# Patient Record
Sex: FEMALE | Race: WHITE | NOT HISPANIC OR LATINO | Employment: UNEMPLOYED | URBAN - METROPOLITAN AREA
[De-identification: names, ages, dates, MRNs, and addresses within clinical notes are randomized per-mention and may not be internally consistent; named-entity substitution may affect disease eponyms.]

---

## 2022-05-11 ENCOUNTER — OFFICE VISIT (OUTPATIENT)
Dept: FAMILY MEDICINE CLINIC | Facility: CLINIC | Age: 22
End: 2022-05-11

## 2022-05-11 VITALS
OXYGEN SATURATION: 98 % | DIASTOLIC BLOOD PRESSURE: 70 MMHG | HEART RATE: 101 BPM | TEMPERATURE: 98.4 F | BODY MASS INDEX: 40.63 KG/M2 | SYSTOLIC BLOOD PRESSURE: 110 MMHG | WEIGHT: 252.8 LBS | HEIGHT: 66 IN

## 2022-05-11 DIAGNOSIS — D22.9 CHANGE IN MOLE: ICD-10-CM

## 2022-05-11 DIAGNOSIS — R00.2 PALPITATIONS: ICD-10-CM

## 2022-05-11 DIAGNOSIS — F33.2 SEVERE EPISODE OF RECURRENT MAJOR DEPRESSIVE DISORDER, WITHOUT PSYCHOTIC FEATURES: ICD-10-CM

## 2022-05-11 DIAGNOSIS — K21.9 GASTROESOPHAGEAL REFLUX DISEASE, UNSPECIFIED WHETHER ESOPHAGITIS PRESENT: ICD-10-CM

## 2022-05-11 DIAGNOSIS — J30.9 ALLERGIC RHINITIS, UNSPECIFIED SEASONALITY, UNSPECIFIED TRIGGER: ICD-10-CM

## 2022-05-11 DIAGNOSIS — F41.9 ANXIETY: ICD-10-CM

## 2022-05-11 DIAGNOSIS — K59.00 CONSTIPATION, UNSPECIFIED CONSTIPATION TYPE: ICD-10-CM

## 2022-05-11 DIAGNOSIS — Z13.220 ENCOUNTER FOR LIPID SCREENING FOR CARDIOVASCULAR DISEASE: ICD-10-CM

## 2022-05-11 DIAGNOSIS — Z00.00 ENCOUNTER FOR ANNUAL PHYSICAL EXAM: Primary | ICD-10-CM

## 2022-05-11 DIAGNOSIS — O24.419 GESTATIONAL DIABETES MELLITUS (GDM), ANTEPARTUM, GESTATIONAL DIABETES METHOD OF CONTROL UNSPECIFIED: ICD-10-CM

## 2022-05-11 DIAGNOSIS — Z13.6 ENCOUNTER FOR LIPID SCREENING FOR CARDIOVASCULAR DISEASE: ICD-10-CM

## 2022-05-11 DIAGNOSIS — R35.0 URINARY FREQUENCY: ICD-10-CM

## 2022-05-11 LAB
BILIRUB BLD-MCNC: NEGATIVE MG/DL
CLARITY, POC: CLEAR
COLOR UR: ABNORMAL
EXPIRATION DATE: ABNORMAL
GLUCOSE UR STRIP-MCNC: NEGATIVE MG/DL
KETONES UR QL: ABNORMAL
LEUKOCYTE EST, POC: NEGATIVE
Lab: ABNORMAL
NITRITE UR-MCNC: NEGATIVE MG/ML
PH UR: 5.5 [PH] (ref 5–8)
PROT UR STRIP-MCNC: ABNORMAL MG/DL
RBC # UR STRIP: NEGATIVE /UL
SP GR UR: 1.03 (ref 1–1.03)
UROBILINOGEN UR QL: NORMAL

## 2022-05-11 PROCEDURE — 81003 URINALYSIS AUTO W/O SCOPE: CPT | Performed by: NURSE PRACTITIONER

## 2022-05-11 PROCEDURE — 99385 PREV VISIT NEW AGE 18-39: CPT | Performed by: NURSE PRACTITIONER

## 2022-05-11 RX ORDER — PANTOPRAZOLE SODIUM 20 MG/1
20 TABLET, DELAYED RELEASE ORAL DAILY
Qty: 30 TABLET | Refills: 1 | Status: SHIPPED | OUTPATIENT
Start: 2022-05-11 | End: 2022-05-11 | Stop reason: SDUPTHER

## 2022-05-11 RX ORDER — PANTOPRAZOLE SODIUM 20 MG/1
20 TABLET, DELAYED RELEASE ORAL DAILY
Qty: 30 TABLET | Refills: 1 | Status: SHIPPED | OUTPATIENT
Start: 2022-05-11 | End: 2022-07-14

## 2022-05-11 RX ORDER — ESCITALOPRAM OXALATE 10 MG/1
10 TABLET ORAL DAILY
Qty: 30 TABLET | Refills: 1 | Status: SHIPPED | OUTPATIENT
Start: 2022-05-11 | End: 2022-06-13 | Stop reason: SDUPTHER

## 2022-05-11 RX ORDER — ESCITALOPRAM OXALATE 10 MG/1
10 TABLET ORAL DAILY
COMMUNITY
End: 2022-05-11 | Stop reason: SDUPTHER
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
## 2022-05-11 NOTE — PATIENT INSTRUCTIONS
Try over the counter antihistamine, such as Claritin or Allegra daily.  May add nasal steroid, such as Flonase or Nasacort.  Try over the counter MiraLax daily until loose bowel movements, then may decrease to every other day.  Continue to work on healthy diet and exercise.  Follow up pending lab results.  Follow up in 1 month, or sooner if symptoms persist or worsen.

## 2022-05-12 PROBLEM — K59.00 CONSTIPATION: Status: ACTIVE | Noted: 2022-05-12

## 2022-05-12 PROBLEM — D22.9 CHANGE IN MOLE: Status: ACTIVE | Noted: 2022-05-12

## 2022-05-12 PROBLEM — R35.0 URINARY FREQUENCY: Status: ACTIVE | Noted: 2022-05-12

## 2022-05-12 PROBLEM — R00.2 PALPITATIONS: Status: ACTIVE | Noted: 2022-05-12

## 2022-05-12 PROBLEM — K21.9 GASTROESOPHAGEAL REFLUX DISEASE: Status: ACTIVE | Noted: 2022-05-12

## 2022-05-12 PROBLEM — O24.419 GESTATIONAL DIABETES MELLITUS (GDM), ANTEPARTUM: Status: RESOLVED | Noted: 2022-05-12 | Resolved: 2022-05-12

## 2022-05-12 PROBLEM — F41.9 ANXIETY: Status: ACTIVE | Noted: 2022-05-12

## 2022-05-12 PROBLEM — J30.9 ALLERGIC RHINITIS: Status: ACTIVE | Noted: 2022-05-12

## 2022-05-12 PROBLEM — O24.419 GESTATIONAL DIABETES MELLITUS (GDM), ANTEPARTUM: Status: ACTIVE | Noted: 2022-05-12

## 2022-05-12 PROBLEM — F33.2 SEVERE EPISODE OF RECURRENT MAJOR DEPRESSIVE DISORDER, WITHOUT PSYCHOTIC FEATURES: Status: ACTIVE | Noted: 2022-05-12

## 2022-05-12 LAB
ALBUMIN SERPL-MCNC: 4.3 G/DL (ref 3.9–5)
ALBUMIN/GLOB SERPL: 1.7 {RATIO} (ref 1.2–2.2)
ALP SERPL-CCNC: 88 IU/L (ref 44–121)
ALT SERPL-CCNC: 13 IU/L (ref 0–32)
AST SERPL-CCNC: 12 IU/L (ref 0–40)
BASOPHILS # BLD AUTO: 0.1 X10E3/UL (ref 0–0.2)
BASOPHILS NFR BLD AUTO: 1 %
BILIRUB SERPL-MCNC: 0.7 MG/DL (ref 0–1.2)
BUN SERPL-MCNC: 15 MG/DL (ref 6–20)
BUN/CREAT SERPL: 21 (ref 9–23)
CALCIUM SERPL-MCNC: 9.7 MG/DL (ref 8.7–10.2)
CHLORIDE SERPL-SCNC: 103 MMOL/L (ref 96–106)
CHOLEST SERPL-MCNC: 171 MG/DL (ref 100–199)
CO2 SERPL-SCNC: 23 MMOL/L (ref 20–29)
CREAT SERPL-MCNC: 0.73 MG/DL (ref 0.57–1)
EGFRCR SERPLBLD CKD-EPI 2021: 118 ML/MIN/1.73
EOSINOPHIL # BLD AUTO: 0.2 X10E3/UL (ref 0–0.4)
EOSINOPHIL NFR BLD AUTO: 2 %
ERYTHROCYTE [DISTWIDTH] IN BLOOD BY AUTOMATED COUNT: 13.1 % (ref 11.7–15.4)
GLOBULIN SER CALC-MCNC: 2.6 G/DL (ref 1.5–4.5)
GLUCOSE SERPL-MCNC: 84 MG/DL (ref 65–99)
HBA1C MFR BLD: 5.2 % (ref 4.8–5.6)
HCT VFR BLD AUTO: 42 % (ref 34–46.6)
HDLC SERPL-MCNC: 43 MG/DL
HGB BLD-MCNC: 13.2 G/DL (ref 11.1–15.9)
IMM GRANULOCYTES # BLD AUTO: 0 X10E3/UL (ref 0–0.1)
IMM GRANULOCYTES NFR BLD AUTO: 0 %
LDLC SERPL CALC-MCNC: 111 MG/DL (ref 0–99)
LDLC/HDLC SERPL: 2.6 RATIO (ref 0–3.2)
LYMPHOCYTES # BLD AUTO: 2 X10E3/UL (ref 0.7–3.1)
LYMPHOCYTES NFR BLD AUTO: 28 %
MAGNESIUM SERPL-MCNC: 1.9 MG/DL (ref 1.6–2.3)
MCH RBC QN AUTO: 26.5 PG (ref 26.6–33)
MCHC RBC AUTO-ENTMCNC: 31.4 G/DL (ref 31.5–35.7)
MCV RBC AUTO: 84 FL (ref 79–97)
MONOCYTES # BLD AUTO: 0.4 X10E3/UL (ref 0.1–0.9)
MONOCYTES NFR BLD AUTO: 6 %
NEUTROPHILS # BLD AUTO: 4.4 X10E3/UL (ref 1.4–7)
NEUTROPHILS NFR BLD AUTO: 63 %
PLATELET # BLD AUTO: 424 X10E3/UL (ref 150–450)
POTASSIUM SERPL-SCNC: 4.9 MMOL/L (ref 3.5–5.2)
PROT SERPL-MCNC: 6.9 G/DL (ref 6–8.5)
RBC # BLD AUTO: 4.99 X10E6/UL (ref 3.77–5.28)
SODIUM SERPL-SCNC: 142 MMOL/L (ref 134–144)
TRIGL SERPL-MCNC: 90 MG/DL (ref 0–149)
TSH SERPL DL<=0.005 MIU/L-ACNC: 1.01 UIU/ML (ref 0.45–4.5)
VLDLC SERPL CALC-MCNC: 17 MG/DL (ref 5–40)
WBC # BLD AUTO: 7.1 X10E3/UL (ref 3.4–10.8)

## 2022-05-12 NOTE — ASSESSMENT & PLAN NOTE
Try over the counter MiraLax daily until loose bowel movements, then may decrease to every other day.

## 2022-05-12 NOTE — ASSESSMENT & PLAN NOTE
Try over the counter antihistamine, such as Claritin or Allegra daily.  May add nasal steroid, such as Flonase or Nasacort.

## 2022-05-12 NOTE — ASSESSMENT & PLAN NOTE
Patient's depression is recurrent and is severe without psychosis. Their depression is currently active and the condition is worsening since has been off medication. This will be reassessed in 4 weeks. F/U as described:patient was prescribed an antidepressant medicine and patient referred to Mental Health Specialist.  Resume Escitalopram daily.

## 2022-05-13 DIAGNOSIS — K30 INDIGESTION: ICD-10-CM

## 2022-05-13 DIAGNOSIS — K21.9 GASTROESOPHAGEAL REFLUX DISEASE, UNSPECIFIED WHETHER ESOPHAGITIS PRESENT: Primary | ICD-10-CM

## 2022-06-13 ENCOUNTER — OFFICE VISIT (OUTPATIENT)
Dept: FAMILY MEDICINE CLINIC | Facility: CLINIC | Age: 22
End: 2022-06-13

## 2022-06-13 VITALS
HEART RATE: 92 BPM | HEIGHT: 66 IN | BODY MASS INDEX: 38.92 KG/M2 | SYSTOLIC BLOOD PRESSURE: 102 MMHG | WEIGHT: 242.2 LBS | OXYGEN SATURATION: 97 % | DIASTOLIC BLOOD PRESSURE: 68 MMHG | TEMPERATURE: 98 F

## 2022-06-13 DIAGNOSIS — R00.2 PALPITATIONS: ICD-10-CM

## 2022-06-13 DIAGNOSIS — K30 INDIGESTION: ICD-10-CM

## 2022-06-13 DIAGNOSIS — F33.2 SEVERE EPISODE OF RECURRENT MAJOR DEPRESSIVE DISORDER, WITHOUT PSYCHOTIC FEATURES: ICD-10-CM

## 2022-06-13 DIAGNOSIS — F41.9 ANXIETY: Primary | ICD-10-CM

## 2022-06-13 DIAGNOSIS — K21.9 GASTROESOPHAGEAL REFLUX DISEASE, UNSPECIFIED WHETHER ESOPHAGITIS PRESENT: ICD-10-CM

## 2022-06-13 PROCEDURE — 93000 ELECTROCARDIOGRAM COMPLETE: CPT | Performed by: NURSE PRACTITIONER

## 2022-06-13 PROCEDURE — 99213 OFFICE O/P EST LOW 20 MIN: CPT | Performed by: NURSE PRACTITIONER

## 2022-06-13 RX ORDER — ESCITALOPRAM OXALATE 10 MG/1
10 TABLET ORAL DAILY
Qty: 30 TABLET | Refills: 2 | Status: SHIPPED | OUTPATIENT
Start: 2022-06-13 | End: 2022-07-15

## 2022-06-13 NOTE — PATIENT INSTRUCTIONS
Call  regarding psychiatry referral.  Continue to work on healthy diet and exercise.  Follow up in 3 months, or sooner if symptoms persist or worsen.  Follow up as scheduled with GI.

## 2022-06-14 PROBLEM — K30 INDIGESTION: Status: ACTIVE | Noted: 2022-06-14

## 2022-06-14 NOTE — ASSESSMENT & PLAN NOTE
Not much improvement with Pantoprazole.  Reschedule abdominal US.  Follow up as scheduled with GI.

## 2022-06-14 NOTE — ASSESSMENT & PLAN NOTE
Continue Escitalopram daily.  Call  regarding psychiatry referral.  Continue to work on healthy diet and exercise.

## 2022-06-14 NOTE — ASSESSMENT & PLAN NOTE
Patient's depression is recurrent and is moderate without psychosis. Their depression is currently active and the condition is improving with treatment. This will be reassessed in 3 months. F/U as described:patient will continue current medication therapy and patient referred to Mental Health Specialist.  Continue Escitalopram daily.

## 2022-07-12 ENCOUNTER — OFFICE VISIT (OUTPATIENT)
Dept: GASTROENTEROLOGY | Facility: CLINIC | Age: 22
End: 2022-07-12

## 2022-07-12 VITALS
TEMPERATURE: 97.8 F | HEIGHT: 66 IN | DIASTOLIC BLOOD PRESSURE: 72 MMHG | SYSTOLIC BLOOD PRESSURE: 114 MMHG | WEIGHT: 252.4 LBS | BODY MASS INDEX: 40.56 KG/M2 | HEART RATE: 89 BPM | OXYGEN SATURATION: 97 %

## 2022-07-12 DIAGNOSIS — R12 HEARTBURN: ICD-10-CM

## 2022-07-12 DIAGNOSIS — R10.10 PAIN OF UPPER ABDOMEN: Primary | ICD-10-CM

## 2022-07-12 DIAGNOSIS — R11.0 NAUSEA: ICD-10-CM

## 2022-07-12 PROCEDURE — 99214 OFFICE O/P EST MOD 30 MIN: CPT | Performed by: NURSE PRACTITIONER

## 2022-07-12 NOTE — PROGRESS NOTES
"No chief complaint on file.          History of Present Illness  21-year-old female presents today for abdominal pain.  She is a new patient with our practice.    She has had right upper quadrant abdominal pain for the past 5 to 6 years after eating.  Pain is worse with larger meals.  No specific food triggers.  Pain last 20+ minutes.  Pain will radiate to her back.      Previous treatment with omeprazole did not provide improvement in symptoms.  She is currently on pantoprazole, she also does not think this is providing improvement.    She will have difficulty with drier bulkier foods that can feel stuck in her esophagus.  No forced regurgitation.  No difficulty with liquids or pills.    She also has nausea that is worse with larger meals.  No vomiting.      No weight loss.     No NSAIDS.    No previous EGD.    Daily use of vape.    Review of Systems      Result Review :       Hemoglobin A1c (05/11/2022 13:53)   Comprehensive Metabolic Panel (05/11/2022 13:53)   CBC & Differential (05/11/2022 13:53)       Vital Signs:   /72   Pulse 89   Temp 97.8 °F (36.6 °C)   Ht 167.6 cm (66\")   Wt 114 kg (252 lb 6.4 oz)   SpO2 97%   BMI 40.74 kg/m²     Body mass index is 40.74 kg/m².     Physical Exam  Vitals reviewed.   Constitutional:       General: She is not in acute distress.     Appearance: Normal appearance. She is not ill-appearing, toxic-appearing or diaphoretic.   Abdominal:      General: Bowel sounds are normal. There is no distension.      Palpations: Abdomen is soft. Abdomen is not rigid. There is no pulsatile mass.      Tenderness: There is abdominal tenderness (Right upper quadrant tenderness with light palpation). There is no guarding or rebound.   Neurological:      Mental Status: She is alert.       Assessment and Plan    Diagnoses and all orders for this visit:    1. Pain of upper abdomen (Primary)    2. Nausea    3. Heartburn    Longstanding abdominal pain right upper quadrant with oral intake.  "   Unknown etiology.  Will continue pantoprazole.  Recommend evaluation of gallbladder and EGD.  Orders placed for HIDA scan and right upper quadrant ultrasound.  Recommend follow-up after the above work-up.        Patient Instructions   Proceed with gallbladder evaluation with HIDA scan and ultrasound    Schedule EGD for further evaluation, orders placed.    Continue pantoprazole    Additional recommendations will be made based on EGD findings.           EMR Dragon/Transcription Disclaimer:  This document has been Dictated utilizing Dragon dictation.

## 2022-07-12 NOTE — PATIENT INSTRUCTIONS
Proceed with gallbladder evaluation with HIDA scan and ultrasound    Schedule EGD for further evaluation, orders placed.    Continue pantoprazole    Additional recommendations will be made based on EGD findings.

## 2022-07-13 ENCOUNTER — PATIENT ROUNDING (BHMG ONLY) (OUTPATIENT)
Dept: GASTROENTEROLOGY | Facility: CLINIC | Age: 22
End: 2022-07-13

## 2022-07-13 ENCOUNTER — PREP FOR SURGERY (OUTPATIENT)
Dept: SURGERY | Facility: SURGERY CENTER | Age: 22
End: 2022-07-13

## 2022-07-13 DIAGNOSIS — R12 HEARTBURN: ICD-10-CM

## 2022-07-13 DIAGNOSIS — R10.10 PAIN OF UPPER ABDOMEN: Primary | ICD-10-CM

## 2022-07-13 DIAGNOSIS — R11.0 NAUSEA: ICD-10-CM

## 2022-07-13 DIAGNOSIS — K21.9 GASTROESOPHAGEAL REFLUX DISEASE, UNSPECIFIED WHETHER ESOPHAGITIS PRESENT: ICD-10-CM

## 2022-07-13 RX ORDER — SODIUM CHLORIDE 0.9 % (FLUSH) 0.9 %
3 SYRINGE (ML) INJECTION EVERY 12 HOURS SCHEDULED
Status: CANCELLED | OUTPATIENT
Start: 2022-07-13

## 2022-07-13 RX ORDER — SODIUM CHLORIDE, SODIUM LACTATE, POTASSIUM CHLORIDE, CALCIUM CHLORIDE 600; 310; 30; 20 MG/100ML; MG/100ML; MG/100ML; MG/100ML
30 INJECTION, SOLUTION INTRAVENOUS CONTINUOUS PRN
Status: CANCELLED | OUTPATIENT
Start: 2022-07-13

## 2022-07-13 RX ORDER — SODIUM CHLORIDE 0.9 % (FLUSH) 0.9 %
10 SYRINGE (ML) INJECTION AS NEEDED
Status: CANCELLED | OUTPATIENT
Start: 2022-07-13

## 2022-07-13 NOTE — PROGRESS NOTES
July 13, 2022    Hello, may I speak with Clair Overton?    My name is Chloé     I am nurse with [facility]  [address] [address] [phone].    Before we get started may I verify your date of birth? 2000    I am calling to officially welcome you to our practice and ask about your recent visit. Is this a good time to talk? Yes    Tell me about your visit with us. What things went well?  it went very well.       We're always looking for ways to make our patients' experiences even better. Do you have recommendations on ways we may improve?  no    Overall were you satisfied with your first visit to our practice? yes       I appreciate you taking the time to speak with me today. Is there anything else I can do for you?no      Thank you, and have a great day.

## 2022-07-14 DIAGNOSIS — F41.9 ANXIETY: ICD-10-CM

## 2022-07-14 DIAGNOSIS — F33.2 SEVERE EPISODE OF RECURRENT MAJOR DEPRESSIVE DISORDER, WITHOUT PSYCHOTIC FEATURES: ICD-10-CM

## 2022-07-14 DIAGNOSIS — K21.9 GASTROESOPHAGEAL REFLUX DISEASE, UNSPECIFIED WHETHER ESOPHAGITIS PRESENT: ICD-10-CM

## 2022-07-14 RX ORDER — PANTOPRAZOLE SODIUM 20 MG/1
20 TABLET, DELAYED RELEASE ORAL DAILY
Qty: 30 TABLET | Refills: 1 | Status: SHIPPED | OUTPATIENT
Start: 2022-07-14 | End: 2023-03-14

## 2022-07-14 NOTE — TELEPHONE ENCOUNTER
Rx Refill Note  Requested Prescriptions     Pending Prescriptions Disp Refills   • pantoprazole (PROTONIX) 20 MG EC tablet [Pharmacy Med Name: PANTOPRAZOLE 20MG TABLETS] 30 tablet 1     Sig: TAKE 1 TABLET BY MOUTH DAILY      Last office visit with prescribing clinician: 6/13/2022      Next office visit with prescribing clinician: 9/13/2022     Last refill 05/11/22           Ese Huggins MA  07/14/22, 13:15 EDT

## 2022-07-15 RX ORDER — ESCITALOPRAM OXALATE 10 MG/1
10 TABLET ORAL DAILY
Qty: 30 TABLET | Refills: 2 | Status: SHIPPED | OUTPATIENT
Start: 2022-07-15 | End: 2022-12-14 | Stop reason: SDUPTHER

## 2022-07-15 NOTE — TELEPHONE ENCOUNTER
Rx Refill Note  Requested Prescriptions     Pending Prescriptions Disp Refills   • escitalopram (LEXAPRO) 10 MG tablet [Pharmacy Med Name: ESCITALOPRAM 10MG TABLETS] 30 tablet 2     Sig: TAKE 1 TABLET BY MOUTH DAILY      Last office visit with prescribing clinician: 6/13/2022      Next office visit with prescribing clinician: 9/13/2022     LAST REFILL 06/13/2022           Ese Huggins MA  07/15/22, 09:49 EDT

## 2022-09-13 ENCOUNTER — OFFICE VISIT (OUTPATIENT)
Dept: FAMILY MEDICINE CLINIC | Facility: CLINIC | Age: 22
End: 2022-09-13

## 2022-09-13 VITALS
SYSTOLIC BLOOD PRESSURE: 100 MMHG | DIASTOLIC BLOOD PRESSURE: 70 MMHG | HEART RATE: 94 BPM | HEIGHT: 66 IN | WEIGHT: 254.2 LBS | BODY MASS INDEX: 40.85 KG/M2 | TEMPERATURE: 98 F | OXYGEN SATURATION: 97 %

## 2022-09-13 DIAGNOSIS — F33.2 SEVERE EPISODE OF RECURRENT MAJOR DEPRESSIVE DISORDER, WITHOUT PSYCHOTIC FEATURES: ICD-10-CM

## 2022-09-13 DIAGNOSIS — N30.00 ACUTE CYSTITIS WITHOUT HEMATURIA: ICD-10-CM

## 2022-09-13 DIAGNOSIS — R00.2 PALPITATIONS: ICD-10-CM

## 2022-09-13 DIAGNOSIS — K21.9 GASTROESOPHAGEAL REFLUX DISEASE, UNSPECIFIED WHETHER ESOPHAGITIS PRESENT: ICD-10-CM

## 2022-09-13 DIAGNOSIS — F41.9 ANXIETY: ICD-10-CM

## 2022-09-13 DIAGNOSIS — J01.90 ACUTE NON-RECURRENT SINUSITIS, UNSPECIFIED LOCATION: ICD-10-CM

## 2022-09-13 DIAGNOSIS — R35.0 URINARY FREQUENCY: Primary | ICD-10-CM

## 2022-09-13 LAB
B-HCG UR QL: NEGATIVE
BILIRUB BLD-MCNC: NEGATIVE MG/DL
CLARITY, POC: CLEAR
COLOR UR: YELLOW
EXPIRATION DATE: ABNORMAL
EXPIRATION DATE: NORMAL
GLUCOSE UR STRIP-MCNC: NEGATIVE MG/DL
INTERNAL NEGATIVE CONTROL: NORMAL
INTERNAL POSITIVE CONTROL: NORMAL
KETONES UR QL: NEGATIVE
LEUKOCYTE EST, POC: ABNORMAL
Lab: ABNORMAL
Lab: NORMAL
NITRITE UR-MCNC: NEGATIVE MG/ML
PH UR: 6 [PH] (ref 5–8)
PROT UR STRIP-MCNC: NEGATIVE MG/DL
RBC # UR STRIP: NEGATIVE /UL
SP GR UR: 1.02 (ref 1–1.03)
UROBILINOGEN UR QL: ABNORMAL

## 2022-09-13 PROCEDURE — 99214 OFFICE O/P EST MOD 30 MIN: CPT | Performed by: NURSE PRACTITIONER

## 2022-09-13 PROCEDURE — 81025 URINE PREGNANCY TEST: CPT | Performed by: NURSE PRACTITIONER

## 2022-09-13 PROCEDURE — 81003 URINALYSIS AUTO W/O SCOPE: CPT | Performed by: NURSE PRACTITIONER

## 2022-09-13 RX ORDER — AMOXICILLIN 875 MG/1
875 TABLET, COATED ORAL 2 TIMES DAILY
Qty: 14 TABLET | Refills: 0 | Status: SHIPPED | OUTPATIENT
Start: 2022-09-13 | End: 2022-09-13

## 2022-09-13 RX ORDER — AMOXICILLIN AND CLAVULANATE POTASSIUM 875; 125 MG/1; MG/1
1 TABLET, FILM COATED ORAL 2 TIMES DAILY
Qty: 14 TABLET | Refills: 0 | Status: ON HOLD | OUTPATIENT
Start: 2022-09-13 | End: 2022-09-21

## 2022-09-13 NOTE — PATIENT INSTRUCTIONS
Increase Escitalopram 10 mg to 1.5 tablets daily for total of 15 mg daily.  Try nasal steroid, such as Flonase or Nasacort, daily.  Follow up in 3 months, or sooner if symptoms persist or worsen.  Call [phone] to schedule an appointment with cardiology.

## 2022-09-13 NOTE — PROGRESS NOTES
Subjective   Clair Overton is a 22 y.o. female.     Chief Complaint   Patient presents with   • Anxiety     Follow up        History of Present Illness   Patient presents for follow up anxiety/depression: takes Escitalopram daily and helps; has not been as irritable; occasional trouble falling asleep or staying asleep; hard to get up in morning; has had a lot of stress; has not been able to see psychiatry at this point; some days eats too much, some days does not eat at all; see PHQ-9 and HAO-7; no SI/HI; concenred about how would feel with higher dose    F/U indigestion/NATALIA: takes Pantoprazole daily; has been out of medication for period of time; saw GI; will be having US gall bladder and HIDA scan as well as EGD; waiting on insurance to approve.    F/U palpitations: sees cardiology    The following portions of the patient's history were reviewed and updated as appropriate: allergies, current medications, past family history, past medical history, past social history, past surgical history and problem list.    Current Outpatient Medications on File Prior to Visit   Medication Sig   • escitalopram (LEXAPRO) 10 MG tablet TAKE 1 TABLET BY MOUTH DAILY   • pantoprazole (PROTONIX) 20 MG EC tablet TAKE 1 TABLET BY MOUTH DAILY     No current facility-administered medications on file prior to visit.        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   • Shoulder joint pain 8/5/2013       Past Surgical History:   Procedure Laterality Date   • NO PAST SURGERIES         Family History   Problem Relation Age of Onset   • Breast cancer Mother 45   • Ulcerative colitis Father    • Arthritis Father    • Irritable bowel syndrome Maternal Grandmother    • Diabetes Maternal Grandmother    • Hypertension Maternal Grandmother    • Thyroid disease  Maternal Grandmother    • Migraines Maternal Grandmother    • Heart failure Maternal Grandfather    • Diabetes Maternal Grandfather    • Osteoarthritis Maternal Grandfather    • Asthma Maternal Grandfather    • Hyperlipidemia Maternal Grandfather    • Hypertension Maternal Grandfather    • Colon cancer Paternal Grandfather    • Colon polyps Neg Hx    • Crohn's disease Neg Hx        Social History     Socioeconomic History   • Marital status:    Tobacco Use   • Smoking status: Never Smoker   • Smokeless tobacco: Never Used   Vaping Use   • Vaping Use: Every day   • Substances: Nicotine   Substance and Sexual Activity   • Alcohol use: No   • Drug use: Never   • Sexual activity: Not Currently       Review of Systems   Constitutional: Positive for fatigue. Negative for appetite change, chills, fever, unexpected weight gain (weight seems to continue to increase despite being more active; tries to eat healthy diet) and unexpected weight loss.   HENT: Positive for congestion, postnasal drip and sinus pressure (symptoms started about 2 weeks ago; has tried OTC sinus med and helped short term; has tested for COVID-19 virus several times and has been negative). Rhinorrhea: has had runny/stuffy nose.    Respiratory: Negative for chest tightness and shortness of breath. Cough: mild cough on occasion.    Cardiovascular: Negative for chest pain and leg swelling. Palpitations: some at times, sometimes at rest, sometimes with activity.   Gastrointestinal: Negative for blood in stool and GERD. Abdominal pain: some in RUQ at times after eating. Constipation: some at times. Diarrhea: some at times. Indigestion: chest will get tight with eating.   Endocrine: Polydipsia: some.   Genitourinary: Negative for dysuria. Frequency: some for last 3-4 weeks; LMP in July, may have had menses in August. Vaginal bleeding: had some light spotting for 3 days and then resolved.   Musculoskeletal: Back pain: no change in chronic lower back  "pain; occasional raditation of pain down legs, left greater than right; no bladder or bowel dysfunction.   Skin: Negative for rash.   Neurological: Negative for syncope and light-headedness. Dizziness: some on 9/9/22. Headache: some since yesterday with sinus symptoms.       PHQ-9 Depression Screening  Little interest or pleasure in doing things? 1-->several days   Feeling down, depressed, or hopeless? 0-->not at all   Trouble falling or staying asleep, or sleeping too much? 3-->nearly every day   Feeling tired or having little energy? 3-->nearly every day   Poor appetite or overeating? 1-->several days   Feeling bad about yourself - or that you are a failure or have let yourself or your family down? 0-->not at all   Trouble concentrating on things, such as reading the newspaper or watching television? 3-->nearly every day   Moving or speaking so slowly that other people could have noticed? Or the opposite - being so fidgety or restless that you have been moving around a lot more than usual? 0-->not at all   Thoughts that you would be better off dead, or of hurting yourself in some way? 0-->not at all   PHQ-9 Total Score 11   If you checked off any problems, how difficult have these problems made it for you to do your work, take care of things at home, or get along with other people? somewhat difficult     HAO-7 anxiety score: 10    Objective   Vitals:    09/13/22 1256   BP: 100/70   BP Location: Left arm   Patient Position: Sitting   Cuff Size: Adult   Pulse: 94   Temp: 98 °F (36.7 °C)   SpO2: 97%   Weight: 115 kg (254 lb 3.2 oz)   Height: 167.6 cm (66\")     Body mass index is 41.03 kg/m².    Physical Exam  Vitals and nursing note reviewed.   Constitutional:       General: She is not in acute distress.     Appearance: She is well-developed and well-groomed. She is not diaphoretic.   HENT:      Head: Normocephalic.      Right Ear: External ear normal. No decreased hearing noted. Right ear middle ear effusion: mild. " Tympanic membrane is not erythematous.      Left Ear: External ear normal. No decreased hearing noted. Left ear middle ear effusion: mild. Tympanic membrane is not erythematous.      Nose: Nose normal.      Right Sinus: No maxillary sinus tenderness or frontal sinus tenderness.      Left Sinus: No maxillary sinus tenderness or frontal sinus tenderness.      Mouth/Throat:      Mouth: Mucous membranes are moist.      Pharynx: Oropharyngeal exudate: cobblestoning in posterior pharynx. Posterior oropharyngeal erythema: mild in posterior pharynx.   Eyes:      Conjunctiva/sclera: Conjunctivae normal.   Neck:      Vascular: No carotid bruit.   Cardiovascular:      Rate and Rhythm: Normal rate and regular rhythm.      Pulses: Normal pulses.      Heart sounds: Normal heart sounds. No murmur heard.  Pulmonary:      Effort: Pulmonary effort is normal. No respiratory distress.      Breath sounds: Normal breath sounds.   Abdominal:      General: Bowel sounds are normal.      Palpations: Abdomen is soft. There is no hepatomegaly or splenomegaly.      Tenderness: There is abdominal tenderness (mild) in the right upper quadrant. There is no guarding.   Musculoskeletal:      Cervical back: Normal range of motion and neck supple.      Right lower leg: No edema.      Left lower leg: No edema.   Lymphadenopathy:      Cervical: No cervical adenopathy.   Skin:     General: Skin is warm and dry.      Findings: No rash.   Neurological:      Mental Status: She is alert and oriented to person, place, and time.      Gait: Gait is intact.   Psychiatric:         Mood and Affect: Mood normal.         Behavior: Behavior normal.         Thought Content: Thought content normal.         Cognition and Memory: Cognition normal.         Judgment: Judgment normal.         Lab Results   Component Value Date    WBC 7.1 05/11/2022    RBC 4.99 05/11/2022    HGB 13.2 05/11/2022    HCT 42.0 05/11/2022    MCV 84 05/11/2022    MCH 26.5 (L) 05/11/2022    [facility]  31.4 (L) 05/11/2022    RDW 13.1 05/11/2022     05/11/2022    NEUTRORELPCT 63 05/11/2022    LYMPHORELPCT 28 05/11/2022    MONORELPCT 6 05/11/2022    EOSRELPCT 2 05/11/2022    BASORELPCT 1 05/11/2022    NEUTROABS 4.4 05/11/2022    LYMPHSABS 2.0 05/11/2022    MONOSABS 0.4 05/11/2022    EOSABS 0.2 05/11/2022    BASOSABS 0.1 05/11/2022     Lab Results   Component Value Date    GLUCOSE 84 05/11/2022    BUN 15 05/11/2022    CREATININE 0.73 05/11/2022    BCR 21 05/11/2022    K 4.9 05/11/2022    CO2 23 05/11/2022    CALCIUM 9.7 05/11/2022    PROTENTOTREF 6.9 05/11/2022    ALBUMIN 4.3 05/11/2022    LABIL2 1.7 05/11/2022    AST 12 05/11/2022    ALT 13 05/11/2022      Lab Results   Component Value Date    CHLPL 171 05/11/2022    TRIG 90 05/11/2022    HDL 43 05/11/2022    VLDL 17 05/11/2022     (H) 05/11/2022     Lab Results   Component Value Date    TSH 1.010 05/11/2022     Lab Results   Component Value Date    HGBA1C 5.2 05/11/2022     Lab Results   Component Value Date    COLORU Yellow 09/13/2022    CLARITYU Clear 09/13/2022    LEUKOCYTESUR Large (3+) (A) 09/13/2022    BILIRUBINUR Negative 09/13/2022          Assessment    Problem List Items Addressed This Visit     Anxiety    Current Assessment & Plan     Increase Escitalopram 10 mg to 1.5 tablets daily for total of 15 mg daily.         Severe episode of recurrent major depressive disorder, without psychotic features (HCC)    Current Assessment & Plan     Patient's depression is recurrent and is moderate without psychosis. Their depression is currently active and the condition is improving with treatment. This will be reassessed in 3 months. F/U as described:patient will continue current medication therapy.  Increase Escitalopram 10 mg to 1.5 tablets daily for total of 15 mg daily.         Urinary frequency - Primary    Relevant Orders    POC Pregnancy, Urine (Completed)    POC Urinalysis Dipstick, Automated (Completed)    Urine Culture - Urine, Urine, Clean  Catch    Palpitations    Current Assessment & Plan     Call [phone] to schedule an appointment with cardiology.         Gastroesophageal reflux disease    Current Assessment & Plan     Resume Pantoprazole daily.         Acute non-recurrent sinusitis    Current Assessment & Plan     Try nasal steroid, such as Flonase or Nasacort, daily.         Relevant Medications    amoxicillin-clavulanate (Augmentin) 875-125 MG per tablet    Acute cystitis without hematuria    Relevant Medications    amoxicillin-clavulanate (Augmentin) 875-125 MG per tablet          Return in about 3 months (around 12/13/2022) for Recheck.or sooner if symptoms persist or worsen.  Patient is concerned about higher dose of Escitalopram, but mood is not well controlled; will try taking 1.5 tablets of Escitalopram 10 mg for total of 15 mg daily and see if tolerated.       COVID-19 Precautions - Patient was compliant in wearing a mask. When I saw the patient, I used appropriate personal protective equipment (PPE) including mask, gloves, and eye shield (standard procedure).  Hand hygiene was completed before and after seeing the patient.

## 2022-09-14 ENCOUNTER — TELEPHONE (OUTPATIENT)
Dept: GASTROENTEROLOGY | Facility: CLINIC | Age: 22
End: 2022-09-14

## 2022-09-14 NOTE — TELEPHONE ENCOUNTER
Patient calls wanting to make sure [facility] has approved and is paying for her endoscopy.      [phone]

## 2022-09-15 PROBLEM — J01.90 ACUTE NON-RECURRENT SINUSITIS: Status: ACTIVE | Noted: 2022-09-15

## 2022-09-15 PROBLEM — N30.00 ACUTE CYSTITIS WITHOUT HEMATURIA: Status: ACTIVE | Noted: 2022-09-15

## 2022-09-15 NOTE — ASSESSMENT & PLAN NOTE
Patient's depression is recurrent and is moderate without psychosis. Their depression is currently active and the condition is improving with treatment. This will be reassessed in 3 months. F/U as described:patient will continue current medication therapy.  Increase Escitalopram 10 mg to 1.5 tablets daily for total of 15 mg daily.

## 2022-09-16 NOTE — SIGNIFICANT NOTE
Education provided the Patient on the following:    - Nothing to Eat or Drink after MN the night before the procedure  -You will need to have someone drive you home after your EGD and remain with you for 24 hours after the EGD  - The date of your EGD, your are welcome to have one visitor at bedside or remain within 10-15 minutes of [facility] [place]  -Please wear warm socks when you arrive for your EGD  -Remove all jewelry and leave any valuables before arriving on the date of your procedure (all will have to be removed before leaving preop)  -You will need to arrive at 0700 on 9/21/22 EGD  -Feel free to contact us at: [phone] with any additional questions/concerns

## 2022-09-18 LAB
BACTERIA UR CULT: NORMAL
BACTERIA UR CULT: NORMAL

## 2022-09-21 ENCOUNTER — ANESTHESIA EVENT (OUTPATIENT)
Dept: SURGERY | Facility: SURGERY CENTER | Age: 22
End: 2022-09-21

## 2022-09-21 ENCOUNTER — HOSPITAL ENCOUNTER (OUTPATIENT)
Facility: SURGERY CENTER | Age: 22
Setting detail: HOSPITAL OUTPATIENT SURGERY
Discharge: HOME | End: 2022-09-21
Attending: INTERNAL MEDICINE | Admitting: INTERNAL MEDICINE

## 2022-09-21 ENCOUNTER — ANESTHESIA (OUTPATIENT)
Dept: SURGERY | Facility: SURGERY CENTER | Age: 22
End: 2022-09-21

## 2022-09-21 VITALS
SYSTOLIC BLOOD PRESSURE: 122 MMHG | HEIGHT: 66 IN | WEIGHT: 252.8 LBS | RESPIRATION RATE: 16 BRPM | DIASTOLIC BLOOD PRESSURE: 86 MMHG | BODY MASS INDEX: 40.63 KG/M2 | HEART RATE: 90 BPM | OXYGEN SATURATION: 99 % | TEMPERATURE: 98 F

## 2022-09-21 DIAGNOSIS — K21.9 GASTROESOPHAGEAL REFLUX DISEASE, UNSPECIFIED WHETHER ESOPHAGITIS PRESENT: ICD-10-CM

## 2022-09-21 DIAGNOSIS — J01.90 ACUTE NON-RECURRENT SINUSITIS, UNSPECIFIED LOCATION: ICD-10-CM

## 2022-09-21 DIAGNOSIS — R11.0 NAUSEA: ICD-10-CM

## 2022-09-21 DIAGNOSIS — R12 HEARTBURN: ICD-10-CM

## 2022-09-21 DIAGNOSIS — R10.10 PAIN OF UPPER ABDOMEN: ICD-10-CM

## 2022-09-21 DIAGNOSIS — N30.00 ACUTE CYSTITIS WITHOUT HEMATURIA: ICD-10-CM

## 2022-09-21 LAB
B-HCG UR QL: NEGATIVE
EXPIRATION DATE: NORMAL
GLUCOSE BLDC GLUCOMTR-MCNC: 130 MG/DL (ref 70–130)
INTERNAL NEGATIVE CONTROL: NEGATIVE
INTERNAL POSITIVE CONTROL: POSITIVE
Lab: NORMAL

## 2022-09-21 PROCEDURE — 25010000002 PROPOFOL 10 MG/ML EMULSION: Performed by: ANESTHESIOLOGY

## 2022-09-21 PROCEDURE — 43249 ESOPH EGD DILATION <30 MM: CPT | Performed by: INTERNAL MEDICINE

## 2022-09-21 PROCEDURE — 88305 TISSUE EXAM BY PATHOLOGIST: CPT | Performed by: INTERNAL MEDICINE

## 2022-09-21 PROCEDURE — 43239 EGD BIOPSY SINGLE/MULTIPLE: CPT | Performed by: INTERNAL MEDICINE

## 2022-09-21 PROCEDURE — C1726 CATH, BAL DIL, NON-VASCULAR: HCPCS | Performed by: INTERNAL MEDICINE

## 2022-09-21 PROCEDURE — 81025 URINE PREGNANCY TEST: CPT | Performed by: NURSE PRACTITIONER

## 2022-09-21 RX ORDER — SODIUM CHLORIDE 0.9 % (FLUSH) 0.9 %
3 SYRINGE (ML) INJECTION EVERY 12 HOURS SCHEDULED
Status: DISCONTINUED | OUTPATIENT
Start: 2022-09-21 | End: 2022-09-21 | Stop reason: HOSPADM

## 2022-09-21 RX ORDER — PROPOFOL 10 MG/ML
VIAL (ML) INTRAVENOUS AS NEEDED
Status: DISCONTINUED | OUTPATIENT
Start: 2022-09-21 | End: 2022-09-21 | Stop reason: SURG

## 2022-09-21 RX ORDER — LIDOCAINE HYDROCHLORIDE 20 MG/ML
INJECTION, SOLUTION INFILTRATION; PERINEURAL AS NEEDED
Status: DISCONTINUED | OUTPATIENT
Start: 2022-09-21 | End: 2022-09-21 | Stop reason: SURG

## 2022-09-21 RX ORDER — SODIUM CHLORIDE, SODIUM LACTATE, POTASSIUM CHLORIDE, CALCIUM CHLORIDE 600; 310; 30; 20 MG/100ML; MG/100ML; MG/100ML; MG/100ML
30 INJECTION, SOLUTION INTRAVENOUS CONTINUOUS PRN
Status: DISCONTINUED | OUTPATIENT
Start: 2022-09-21 | End: 2022-09-21 | Stop reason: HOSPADM

## 2022-09-21 RX ORDER — AMOXICILLIN AND CLAVULANATE POTASSIUM 875; 125 MG/1; MG/1
1 TABLET, FILM COATED ORAL 2 TIMES DAILY
Qty: 14 TABLET | Refills: 0 | Status: SHIPPED | OUTPATIENT
Start: 2022-09-21 | End: 2022-09-28

## 2022-09-21 RX ORDER — SODIUM CHLORIDE 0.9 % (FLUSH) 0.9 %
10 SYRINGE (ML) INJECTION AS NEEDED
Status: DISCONTINUED | OUTPATIENT
Start: 2022-09-21 | End: 2022-09-21 | Stop reason: HOSPADM

## 2022-09-21 RX ADMIN — LIDOCAINE HYDROCHLORIDE 100 MG: 20 INJECTION, SOLUTION INFILTRATION; PERINEURAL at 08:32

## 2022-09-21 RX ADMIN — SODIUM CHLORIDE, SODIUM LACTATE, POTASSIUM CHLORIDE, AND CALCIUM CHLORIDE 30 ML/HR: .6; .31; .03; .02 INJECTION, SOLUTION INTRAVENOUS at 07:50

## 2022-09-21 RX ADMIN — PROPOFOL 100 MG: 10 INJECTION, EMULSION INTRAVENOUS at 08:32

## 2022-09-21 RX ADMIN — PROPOFOL 140 MCG/KG/MIN: 10 INJECTION, EMULSION INTRAVENOUS at 08:37

## 2022-09-21 RX ADMIN — PROPOFOL 50 MG: 10 INJECTION, EMULSION INTRAVENOUS at 08:33

## 2022-09-21 NOTE — ANESTHESIA PREPROCEDURE EVALUATION
" Anesthesia Evaluation     Patient summary reviewed and Nursing notes reviewed   history of anesthetic complications:  NPO Solid Status: > 8 hours  NPO Liquid Status: > 2 hours           Airway   Mallampati: II  Dental - normal exam     Pulmonary - negative pulmonary ROS   Cardiovascular - negative cardio ROS  Exercise tolerance: good (4-7 METS)    Rhythm: regular        Neuro/Psych  (+) psychiatric history Anxiety and Depression,    GI/Hepatic/Renal/Endo    (+) morbid obesity, GERD,  diabetes mellitus,     Musculoskeletal     (+) back pain,   Abdominal   (+) obese,    Substance History - negative use     OB/GYN negative ob/gyn ROS         Other                        Anesthesia Plan    ASA 2     MAC     (/76 (BP Location: Left arm, Patient Position: Lying)   Pulse 100   Temp 36.6 °C (97.8 °F) (Tympanic)   Resp 16   Ht 167.6 cm (66\")   Wt 115 kg (252 lb 12.8 oz)   SpO2 100%   BMI 40.80 kg/m²     I have reviewed the patient's history with the patient and the chart, including all pertinent laboratory results and imaging. I have explained the risks of anesthesia including but not limited to dental damage, corneal abrasion, nerve injury, MI, stroke, and death.    )    Anesthetic plan, risks, benefits, and alternatives have been provided, discussed and informed consent has been obtained with: patient and spouse/significant other.        CODE STATUS:       "

## 2022-09-21 NOTE — H&P
No chief complaint on file.      HPI  Patient today for an EGD due to epigastric pain, GERD and occasional dysphagia.         Problem List:    Patient Active Problem List   Diagnosis   • Anxiety   • Severe episode of recurrent major depressive disorder, without psychotic features ([facility])   • Urinary frequency   • Palpitations   • Gastroesophageal reflux disease   • Change in mole   • Constipation   • Allergic rhinitis   • Indigestion   • Pain of upper abdomen   • Nausea   • Heartburn   • Acute non-recurrent sinusitis   • Acute cystitis without hematuria       Medical History:    Past Medical History:   Diagnosis Date   • Anxiety    • Back pain    • Gestational diabetes mellitus in pregnancy    • Heart palpitations    • 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   • Shoulder joint pain 08/05/2013        Social History:    Social History     Socioeconomic History   • Marital status:    Tobacco Use   • Smoking status: Never Smoker   • Smokeless tobacco: Never Used   Vaping Use   • Vaping Use: Every day   • Substances: Nicotine   Substance and Sexual Activity   • Alcohol use: No   • Drug use: Never   • Sexual activity: Not Currently       Family History:   Family History   Problem Relation Age of Onset   • Breast cancer Mother 45   • Ulcerative colitis Father    • Arthritis Father    • Irritable bowel syndrome Maternal Grandmother    • Diabetes Maternal Grandmother    • Hypertension Maternal Grandmother    • Thyroid disease Maternal Grandmother    • Migraines Maternal Grandmother    • Heart failure Maternal Grandfather    • Diabetes Maternal Grandfather    • Osteoarthritis Maternal Grandfather    • Asthma Maternal Grandfather    • Hyperlipidemia Maternal Grandfather    • Hypertension Maternal Grandfather    • Colon cancer Paternal Grandfather    • Colon polyps Neg Hx    • Crohn's disease Neg Hx        Surgical History:   Past Surgical History:   Procedure Laterality Date   • NO PAST SURGERIES           Current  Facility-Administered Medications:   •  lactated ringers infusion, 30 mL/hr, Intravenous, Continuous PRN, Marcia Ruffey C, APRN, Last Rate: 30 mL/hr at 09/21/22 0750, 30 mL/hr at 09/21/22 0750  •  sodium chloride 0.9 % flush 10 mL, 10 mL, Intravenous, PRN, Speedy, Griselda C, APRN  •  sodium chloride 0.9 % flush 3 mL, 3 mL, Intravenous, Q12H, Griselda Ruff C, APRN    Allergies: No Known Allergies     The following portions of the patient's history were reviewed by me and updated as appropriate: review of systems, allergies, current medications, past family history, past medical history, past social history, past surgical history and problem list.    Vitals:    09/21/22 0747   BP: 125/76   Pulse: 100   Resp: 16   Temp: 97.8 °F (36.6 °C)   SpO2: 100%       PHYSICAL EXAM:    CONSTITUTIONAL:  today's vital signs reviewed by me  GASTROINTESTINAL: abdomen is soft nontender nondistended with normal active bowel sounds, no masses are appreciated    Assessment/ Plan  We will proceed today with EGD.    Risks and benefits as well as alternatives to endoscopic evaluation were explained to the patient and they voiced understanding and wish to proceed.  These risks include but are not limited to the risk of bleeding, perforation, adverse reaction to sedation, and missed lesions.  The patient was given the opportunity to ask questions prior to the endoscopic procedure.

## 2022-09-21 NOTE — ANESTHESIA POSTPROCEDURE EVALUATION
"Patient: Clair Overton    Procedure Summary     Date: 09/21/22 Room / Location: [facility] / [facility]    Anesthesia Start: 0827 Anesthesia Stop: 0845    Procedure: ESOPHAGOGASTRODUODENOSCOPY WITH COLD BIOPSIES AND WITH BALLOON DILATATION 18MM-20MM (N/A ) Diagnosis:       Pain of upper abdomen      Nausea      Heartburn      Gastroesophageal reflux disease, unspecified whether esophagitis present      (Pain of upper abdomen [R10.10])      (Nausea [R11.0])      (Heartburn [R12])      (Gastroesophageal reflux disease, unspecified whether esophagitis present [K21.9])    Surgeons: Luis A Galindo MD Provider: Tricia Trevizo MD    Anesthesia Type: MAC ASA Status: 2          Anesthesia Type: MAC    Vitals  Vitals Value Taken Time   /78 09/21/22 0848   Temp 36.7 °C (98 °F) 09/21/22 0848   Pulse 82 09/21/22 0848   Resp 16 09/21/22 0848   SpO2 99 % 09/21/22 0848           Post Anesthesia Care and Evaluation    Patient location during evaluation: bedside  Patient participation: complete - patient participated  Level of consciousness: awake  Pain management: adequate    Airway patency: patent  Anesthetic complications: No anesthetic complications  PONV Status: controlled  Cardiovascular status: acceptable  Respiratory status: acceptable  Hydration status: acceptable    Comments: /78 (BP Location: Left arm, Patient Position: Lying)   Pulse 82   Temp 36.7 °C (98 °F) (Temporal)   Resp 16   Ht 167.6 cm (66\")   Wt 115 kg (252 lb 12.8 oz)   SpO2 99%   BMI 40.80 kg/m²         "

## 2022-09-22 LAB
LAB AP CASE REPORT: NORMAL
PATH REPORT.FINAL DX SPEC: NORMAL
PATH REPORT.GROSS SPEC: NORMAL

## 2022-12-14 ENCOUNTER — OFFICE VISIT (OUTPATIENT)
Dept: FAMILY MEDICINE CLINIC | Facility: CLINIC | Age: 22
End: 2022-12-14

## 2022-12-14 VITALS
OXYGEN SATURATION: 98 % | TEMPERATURE: 97.8 F | HEIGHT: 66 IN | DIASTOLIC BLOOD PRESSURE: 64 MMHG | HEART RATE: 90 BPM | RESPIRATION RATE: 16 BRPM | BODY MASS INDEX: 40.98 KG/M2 | SYSTOLIC BLOOD PRESSURE: 118 MMHG | WEIGHT: 255 LBS

## 2022-12-14 DIAGNOSIS — R10.10 PAIN OF UPPER ABDOMEN: ICD-10-CM

## 2022-12-14 DIAGNOSIS — J30.9 ALLERGIC RHINITIS, UNSPECIFIED SEASONALITY, UNSPECIFIED TRIGGER: ICD-10-CM

## 2022-12-14 DIAGNOSIS — M94.0 COSTOCHONDRITIS: ICD-10-CM

## 2022-12-14 DIAGNOSIS — F41.9 ANXIETY: ICD-10-CM

## 2022-12-14 DIAGNOSIS — K30 INDIGESTION: ICD-10-CM

## 2022-12-14 DIAGNOSIS — R00.2 PALPITATIONS: ICD-10-CM

## 2022-12-14 DIAGNOSIS — F33.2 SEVERE EPISODE OF RECURRENT MAJOR DEPRESSIVE DISORDER, WITHOUT PSYCHOTIC FEATURES: ICD-10-CM

## 2022-12-14 DIAGNOSIS — L30.4 INTERTRIGO: ICD-10-CM

## 2022-12-14 DIAGNOSIS — K21.9 GASTROESOPHAGEAL REFLUX DISEASE, UNSPECIFIED WHETHER ESOPHAGITIS PRESENT: Primary | ICD-10-CM

## 2022-12-14 PROCEDURE — 99214 OFFICE O/P EST MOD 30 MIN: CPT | Performed by: NURSE PRACTITIONER

## 2022-12-14 RX ORDER — ESCITALOPRAM OXALATE 10 MG/1
10 TABLET ORAL DAILY
Qty: 30 TABLET | Refills: 2 | Status: SHIPPED | OUTPATIENT
Start: 2022-12-14 | End: 2023-03-14 | Stop reason: SDUPTHER

## 2022-12-14 RX ORDER — NYSTATIN 100000 [USP'U]/G
POWDER TOPICAL 3 TIMES DAILY
Qty: 30 G | Refills: 0 | Status: SHIPPED | OUTPATIENT
Start: 2022-12-14

## 2022-12-14 NOTE — PATIENT INSTRUCTIONS
Continue ice to chest as needed.  Try Tylenol for chest tenderness.  Try over the counter antihistamine, such as Claritin or Allegra.  Follow up in 3 months, or sooner if symptoms persist or worsen.

## 2022-12-14 NOTE — PROGRESS NOTES
Subjective   Clair Overton is a 22 y.o. female.     Chief Complaint   Patient presents with   • Anxiety       History of Present Illness   Patient presents for follow up anxiety/depression: takes Escitalopram daily and helps; happy with current dose; does pretty good if takes medication consistently; has had a lot going on; has had some trouble sleeping the last 2 weeks; has had trouble falling asleep and staying asleep; some poor appetite; see PHQ-9 and HAO-7; no SI/HI; does not want to try higher dose; would like to see counselor.    F/U NATALIA: takes Pantoprazole daily some days, did not seem to help much; discomfort has changed, previously burning sensation, now more a sharp pain in upper abdomen; had EGD and WNL other than esophagitis; has not had recent US gall bladder and HIDA scan at this point.    F/U palpitations: no change in symptoms; never saw cardio; no SOA; occasional chest tightness with palpitations.    Also, c/o discomfort in chest; will need to pop sternum to relieve discomfort; injured chest on 7/1/22 and discomfort has persisted; area is tender with palpation; will hurt to take a deep breath at times; has tried ice, but does not help much; was seen at  [facility] and had negative x-ray of sternum.      The following portions of the patient's history were reviewed and updated as appropriate: allergies, current medications, past family history, past medical history, past social history, past surgical history and problem list.      Current Outpatient Medications   Medication Sig Dispense Refill   • escitalopram (LEXAPRO) 10 MG tablet Take 1 tablet by mouth Daily. 30 tablet 2   • pantoprazole (PROTONIX) 20 MG EC tablet TAKE 1 TABLET BY MOUTH DAILY 30 tablet 1   • nystatin (MYCOSTATIN) 333381 UNIT/GM powder Apply  topically to the appropriate area as directed 3 (Three) Times a Day. 30 g 0     No current facility-administered medications for this visit.       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 RING, ESOPHAGITIS   • NO PAST SURGERIES         Family History   Problem Relation Age of Onset   • Breast cancer Mother 45   • Ulcerative colitis Father    • Arthritis Father    • Irritable bowel syndrome Maternal Grandmother    • Diabetes Maternal Grandmother    • Hypertension Maternal Grandmother    • Thyroid disease Maternal Grandmother    • Migraines Maternal Grandmother    • Heart failure Maternal Grandfather    • Diabetes Maternal Grandfather    • Osteoarthritis Maternal Grandfather    • Asthma Maternal Grandfather    • Hyperlipidemia Maternal Grandfather    • Hypertension Maternal Grandfather    • Colon cancer Paternal Grandfather    • Colon polyps Neg Hx    • Crohn's disease Neg Hx        Social History     Socioeconomic History   • Marital status:    Tobacco Use   • Smoking status: Never   • Smokeless tobacco: Never   Vaping Use   • Vaping Use: Every day   • Substances: Nicotine   Substance and Sexual Activity   • Alcohol use: No   • Drug use: Never   • Sexual activity: Not Currently       Review of Systems   Constitutional: Positive for fatigue. Negative for chills, fever (some few weeks ago), unexpected weight gain and unexpected weight loss. Appetite change: see HPI.   HENT: Negative for ear pain, sinus pressure, sore throat and trouble swallowing.    Respiratory: Negative for cough (some 2 weeks ago, resolved) and shortness of breath.    Cardiovascular: Negative for chest pain and leg swelling.   Gastrointestinal: Negative  "for blood in stool, constipation and diarrhea. Abdominal pain: see HPI.   Genitourinary: Negative for dysuria and frequency.   Skin: Rash: will get some under bilateral breasts and in bilateral groins; area will itch and burn.   Neurological: Positive for headache (has been taking Ibuprofen and helps some; attributes to stress and poor sleep). Dizziness: some at times, not related to position changes. Light-headedness: some if gets up too fast.       PHQ-9 Depression Screening  Little interest or pleasure in doing things? 1-->several days   Feeling down, depressed, or hopeless? 3-->nearly every day   Trouble falling or staying asleep, or sleeping too much? 3-->nearly every day   Feeling tired or having little energy? 3-->nearly every day   Poor appetite or overeating? 3-->nearly every day   Feeling bad about yourself - or that you are a failure or have let yourself or your family down? 3-->nearly every day   Trouble concentrating on things, such as reading the newspaper or watching television? 0-->not at all   Moving or speaking so slowly that other people could have noticed? Or the opposite - being so fidgety or restless that you have been moving around a lot more than usual? 0-->not at all   Thoughts that you would be better off dead, or of hurting yourself in some way? 0-->not at all   PHQ-9 Total Score 16   If you checked off any problems, how difficult have these problems made it for you to do your work, take care of things at home, or get along with other people? extremely difficult       HAO-7 anxiety score 19    Objective   Vitals:    12/14/22 1342   BP: 118/64   BP Location: Right arm   Patient Position: Sitting   Cuff Size: Large Adult   Pulse: 90   Resp: 16   Temp: 97.8 °F (36.6 °C)   TempSrc: Temporal   SpO2: 98%   Weight: 116 kg (255 lb)   Height: 167.6 cm (65.98\")     Body mass index is 41.18 kg/m².    Physical Exam  Vitals and nursing note reviewed.   Constitutional:       General: She is not in acute " distress.     Appearance: She is well-developed and well-groomed. She is not diaphoretic.   HENT:      Head: Normocephalic.      Right Ear: Tympanic membrane and external ear normal. No decreased hearing noted.      Left Ear: Tympanic membrane and external ear normal. No decreased hearing noted.      Nose: Nose normal.      Right Sinus: No maxillary sinus tenderness or frontal sinus tenderness.      Left Sinus: No maxillary sinus tenderness or frontal sinus tenderness.      Mouth/Throat:      Mouth: Mucous membranes are moist.      Pharynx: Posterior oropharyngeal erythema present. No oropharyngeal exudate (drainage noted in posterior pharynx).   Eyes:      Conjunctiva/sclera: Conjunctivae normal.   Neck:      Vascular: No carotid bruit.   Cardiovascular:      Rate and Rhythm: Normal rate and regular rhythm.      Pulses: Normal pulses.      Heart sounds: Normal heart sounds. No murmur heard.  Pulmonary:      Effort: Pulmonary effort is normal. No respiratory distress.      Breath sounds: Normal breath sounds.   Chest:       Abdominal:      General: Bowel sounds are normal.      Palpations: Abdomen is soft. There is no hepatomegaly or splenomegaly.      Tenderness: There is no abdominal tenderness. There is no guarding or rebound.   Musculoskeletal:      Cervical back: Normal range of motion and neck supple.      Right lower leg: No edema.      Left lower leg: No edema.   Lymphadenopathy:      Cervical: No cervical adenopathy.   Skin:     General: Skin is warm and dry.   Neurological:      Mental Status: She is alert and oriented to person, place, and time.      Cranial Nerves: Cranial nerves 2-12 are intact.      Gait: Gait is intact.   Psychiatric:         Mood and Affect: Mood normal.         Behavior: Behavior normal.         Thought Content: Thought content normal.         Cognition and Memory: Cognition normal.         Judgment: Judgment normal.         Lab Results   Component Value Date    WBC 7.1 05/11/2022     RBC 4.99 05/11/2022    HGB 13.2 05/11/2022    HCT 42.0 05/11/2022    MCV 84 05/11/2022    MCH 26.5 (L) 05/11/2022    MCHC 31.4 (L) 05/11/2022    RDW 13.1 05/11/2022     05/11/2022    NEUTRORELPCT 63 05/11/2022    LYMPHORELPCT 28 05/11/2022    MONORELPCT 6 05/11/2022    EOSRELPCT 2 05/11/2022    BASORELPCT 1 05/11/2022    NEUTROABS 4.4 05/11/2022    LYMPHSABS 2.0 05/11/2022    MONOSABS 0.4 05/11/2022    EOSABS 0.2 05/11/2022    BASOSABS 0.1 05/11/2022     Lab Results   Component Value Date    GLUCOSE 84 05/11/2022    BUN 15 05/11/2022    CREATININE 0.73 05/11/2022    BCR 21 05/11/2022    K 4.9 05/11/2022    CO2 23 05/11/2022    CALCIUM 9.7 05/11/2022    PROTENTOTREF 6.9 05/11/2022    ALBUMIN 4.3 05/11/2022    LABIL2 1.7 05/11/2022    AST 12 05/11/2022    ALT 13 05/11/2022      Lab Results   Component Value Date    CHLPL 171 05/11/2022    TRIG 90 05/11/2022    HDL 43 05/11/2022    VLDL 17 05/11/2022     (H) 05/11/2022     Lab Results   Component Value Date    TSH 1.010 05/11/2022     Lab Results   Component Value Date    HGBA1C 5.2 05/11/2022 7/11/22 x-ray sternum: no acute fracture; xyphoid is intact      Assessment    Problem List Items Addressed This Visit     Anxiety    Current Assessment & Plan     Continue Escitalopram daily.         Relevant Medications    escitalopram (LEXAPRO) 10 MG tablet    Other Relevant Orders    Ambulatory Referral to Psychology    Severe episode of recurrent major depressive disorder, without psychotic features (HCC)    Current Assessment & Plan     Patient's depression is recurrent and is moderate without psychosis. Their depression is currently active and the condition is unchanged. This will be reassessed in 3 months. F/U as described:patient will continue current medication therapy and patient referred to Mental Health Specialist.  Continue Escitalopram daily.         Relevant Medications    escitalopram (LEXAPRO) 10 MG tablet    Other Relevant Orders     Ambulatory Referral to Psychology    Palpitations    Current Assessment & Plan     Call to schedule appointment with cardiology.         Gastroesophageal reflux disease - Primary    Current Assessment & Plan     Continue Pantoprazole daily as needed.         Allergic rhinitis    Current Assessment & Plan     Try over the counter antihistamine, such as Claritin or Allegra.         Indigestion    Relevant Orders    US Abdomen Limited    Pain of upper abdomen    Overview     Added automatically from request for surgery [id number]         Relevant Orders    US Abdomen Limited    Intertrigo    Relevant Medications    nystatin (MYCOSTATIN) [id number] UNIT/GM powder    Costochondritis    Current Assessment & Plan     Continue ice to chest as needed.  Try Tylenol for chest tenderness.               Return in about 3 months (around 3/14/2023) for Recheck.or sooner if symptoms persist or worsen.  Mood is not well controlled; pt does not want to try higher dose of Escitalopram or alternative medication; will refer to counselor/therapist for further evaluation/treatment.  Patient never heard about previously ordered abdominal US; will reorder US abdomen for further evaluation of symptoms.       COVID-19 Precautions - Patient was compliant in wearing a mask. When I saw the patient, I used appropriate personal protective equipment (PPE) including mask, gloves, and eye shield (standard procedure).  Hand hygiene was completed before and after seeing the patient.

## 2022-12-16 PROBLEM — L30.4 INTERTRIGO: Status: ACTIVE | Noted: 2022-12-16

## 2022-12-17 PROBLEM — M94.0 COSTOCHONDRITIS: Status: ACTIVE | Noted: 2022-12-17

## 2022-12-17 NOTE — ASSESSMENT & PLAN NOTE
Patient's depression is recurrent and is moderate without psychosis. Their depression is currently active and the condition is unchanged. This will be reassessed in 3 months. F/U as described:patient will continue current medication therapy and patient referred to Mental Health Specialist.  Continue Escitalopram daily.

## 2022-12-27 ENCOUNTER — APPOINTMENT (OUTPATIENT)
Dept: ULTRASOUND IMAGING | Facility: HOSPITAL | Age: 22
End: 2022-12-27

## 2022-12-28 ENCOUNTER — HOSPITAL ENCOUNTER (OUTPATIENT)
Dept: ULTRASOUND IMAGING | Facility: HOSPITAL | Age: 22
Discharge: HOME | End: 2022-12-28
Admitting: NURSE PRACTITIONER

## 2022-12-28 DIAGNOSIS — R10.10 PAIN OF UPPER ABDOMEN: ICD-10-CM

## 2022-12-28 DIAGNOSIS — K30 INDIGESTION: ICD-10-CM

## 2022-12-28 PROCEDURE — 76705 ECHO EXAM OF ABDOMEN: CPT

## 2022-12-29 ENCOUNTER — TELEPHONE (OUTPATIENT)
Dept: FAMILY MEDICINE CLINIC | Facility: CLINIC | Age: 22
End: 2022-12-29

## 2022-12-29 DIAGNOSIS — K80.20 GALLSTONES: Primary | ICD-10-CM

## 2022-12-29 NOTE — TELEPHONE ENCOUNTER
----- Message from MARY Echavarria sent at 12/28/2022  4:10 PM EST -----  Please inform patient that abdominal ultrasound shows gallstones and biliary sludge so recommend general surgery referral for possible removal.

## 2023-01-12 ENCOUNTER — OFFICE VISIT (OUTPATIENT)
Dept: SURGERY | Facility: CLINIC | Age: 23
End: 2023-01-12
Payer: OTHER GOVERNMENT

## 2023-01-12 VITALS — HEIGHT: 66 IN | WEIGHT: 259 LBS | BODY MASS INDEX: 41.62 KG/M2

## 2023-01-12 DIAGNOSIS — K80.10 CALCULUS OF GALLBLADDER WITH CHRONIC CHOLECYSTITIS WITHOUT OBSTRUCTION: Primary | ICD-10-CM

## 2023-01-12 PROCEDURE — 99204 OFFICE O/P NEW MOD 45 MIN: CPT | Performed by: SURGERY

## 2023-01-12 RX ORDER — CEFAZOLIN SODIUM 2 G/100ML
2 INJECTION, SOLUTION INTRAVENOUS ONCE
Status: CANCELLED | OUTPATIENT
Start: 2023-01-27 | End: 2023-01-12

## 2023-01-25 NOTE — PROGRESS NOTES
General Surgery  Initial Office Visit    CC: Gallstones, abdominal pain    HPI: The patient is a pleasant 22 y.o. year-old lady who presents today for evaluation of right upper quadrant postprandial abdominal pain which has been ongoing daily for the last 5 to 6 years.  It always occurs with eating, typically last about 20 to 25 minutes before subsiding, and now seems to be happening with anything that she eats or drinks.  The pain does not radiate and it feels like a stabbing sensation.  Work-up of this included an abdominal ultrasound which revealed a 2.1 cm gallstone as well as some biliary sludge.  She has a family history of gallbladder pathology in her mother who required cholecystectomy.    Past Medical History:   GERD  Anxiety  History of depression    Past Surgical History:   EGD (September 2022, Dr. 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), non-smoker, no regular alcohol use    ROS:   Constitutional: Positive for unexpected weight gain; negative for fevers or chills  HENT: Positive for tinnitus; negative for hearing loss or runny nose  Eyes: Negative for vision changes or scleral icterus  Respiratory: Positive for shortness of breath, choking, and dizziness with exertion; negative for cough  Cardiovascular: Positive for heart palpitations; negative for chest pain or leg swelling  Gastrointestinal: Positive for abdominal pain and constipation; negative for abdominal distension, nausea, vomiting, diarrhea, melena, or hematochezia  Genitourinary: Positive for frequent urination, menstrual problems, and vaginal pain; negative for  hematuria or dysuria  Endocrine: Positive for cold intolerance, heat intolerance, excessive hunger, and excessive urination; negative for excessive thirst  Musculoskeletal: Positive for joint swelling, back pain, neck pain, and limited range of motion of the back and bilateral shoulder; negative for gait instability  Neurologic: Positive for balance problems, dizziness, headaches, and lightheadedness; negative for tremors or seizures  Psychiatric: Positive for sleep disturbance, nervousness, depression, decreased concentration, and agitation; negative for suicidal ideations or agitation  All other systems reviewed and negative    Physical Exam:  Height: 167 cm  Weight: 117 kg  BMI: 41.8  General: No acute distress, well-nourished & well-developed  HEAD: normocephalic, atraumatic  EYES: normal conjunctiva, sclera anicteric  EARS: grossly normal hearing  NECK: supple, no thyromegaly  CARDIOVASCULAR: regular rate and rhythm  RESPIRATORY: clear to auscultation bilaterally  GASTROINTESTINAL: soft, mild right upper quadrant tenderness to palpation, non-distended  MUSCULOSKELETAL: normal gait and station. No gross extremity abnormalities  PSYCHIATRIC: oriented x3, normal mood and affect    IMAGING:  ABDOMINAL ULTRASOUND (12/28/2022):  IMPRESSION:  Cholelithiasis and biliary sludge. No biliary ductal dilatation.    ASSESSMENT & PLAN  Mrs. Overton is a 22-year-old lady with recently diagnosed symptomatic cholelithiasis.  I reviewed her recent gallbladder ultrasound and I recommended we proceed with laparoscopic cholecystectomy at her earliest convenience.  I discussed with her the risks of surgery which include but are not limited to bleeding, common bile duct injury, and postoperative bile leak.  I also discussed with her the risks of not operating and these include development of choledocholithiasis, cholangitis, gallstone pancreatitis, and acute cholecystitis.  She is aware the risks and benefits and would like to proceed.   I am happy to get it scheduled for the end of this month.    Mary Blanton MD  General, Robotic, and Endoscopic Surgery  [facility]    [address], [address]  [place] [address]  P: [phone]  F: [phone]

## 2023-01-27 ENCOUNTER — APPOINTMENT (OUTPATIENT)
Dept: GENERAL RADIOLOGY | Facility: HOSPITAL | Age: 23
End: 2023-01-27
Payer: OTHER GOVERNMENT

## 2023-01-27 ENCOUNTER — ANESTHESIA (OUTPATIENT)
Dept: PERIOP | Facility: HOSPITAL | Age: 23
End: 2023-01-27
Payer: OTHER GOVERNMENT

## 2023-01-27 ENCOUNTER — HOSPITAL ENCOUNTER (OUTPATIENT)
Facility: HOSPITAL | Age: 23
Setting detail: HOSPITAL OUTPATIENT SURGERY
Discharge: HOME | End: 2023-01-27
Attending: SURGERY | Admitting: SURGERY
Payer: OTHER GOVERNMENT

## 2023-01-27 ENCOUNTER — ANESTHESIA EVENT (OUTPATIENT)
Dept: PERIOP | Facility: HOSPITAL | Age: 23
End: 2023-01-27
Payer: OTHER GOVERNMENT

## 2023-01-27 VITALS
HEIGHT: 66 IN | WEIGHT: 253.2 LBS | TEMPERATURE: 98.2 F | BODY MASS INDEX: 40.69 KG/M2 | RESPIRATION RATE: 16 BRPM | DIASTOLIC BLOOD PRESSURE: 77 MMHG | OXYGEN SATURATION: 96 % | HEART RATE: 74 BPM | SYSTOLIC BLOOD PRESSURE: 126 MMHG

## 2023-01-27 DIAGNOSIS — K80.10 CALCULUS OF GALLBLADDER WITH CHRONIC CHOLECYSTITIS WITHOUT OBSTRUCTION: Primary | ICD-10-CM

## 2023-01-27 LAB
B-HCG UR QL: NEGATIVE
EXPIRATION DATE: NORMAL
GLUCOSE BLDC GLUCOMTR-MCNC: 101 MG/DL (ref 70–130)
GLUCOSE BLDC GLUCOMTR-MCNC: 116 MG/DL (ref 70–130)
INTERNAL NEGATIVE CONTROL: NEGATIVE
INTERNAL POSITIVE CONTROL: POSITIVE
Lab: NORMAL

## 2023-01-27 PROCEDURE — 25010000002 ONDANSETRON PER 1 MG: Performed by: NURSE ANESTHETIST, CERTIFIED REGISTERED

## 2023-01-27 PROCEDURE — 25010000002 FENTANYL CITRATE (PF) 50 MCG/ML SOLUTION: Performed by: NURSE ANESTHETIST, CERTIFIED REGISTERED

## 2023-01-27 PROCEDURE — 47563 LAPARO CHOLECYSTECTOMY/GRAPH: CPT | Performed by: SURGERY

## 2023-01-27 PROCEDURE — 25010000002 HYDROMORPHONE PER 4 MG: Performed by: NURSE ANESTHETIST, CERTIFIED REGISTERED

## 2023-01-27 PROCEDURE — 25010000002 DEXAMETHASONE SODIUM PHOSPHATE 20 MG/5ML SOLUTION: Performed by: NURSE ANESTHETIST, CERTIFIED REGISTERED

## 2023-01-27 PROCEDURE — 25010000002 CEFAZOLIN IN DEXTROSE 2-4 GM/100ML-% SOLUTION: Performed by: SURGERY

## 2023-01-27 PROCEDURE — 25010000002 FENTANYL CITRATE (PF) 50 MCG/ML SOLUTION: Performed by: ANESTHESIOLOGY

## 2023-01-27 PROCEDURE — 0 IOTHALAMATE 60 % SOLUTION: Performed by: SURGERY

## 2023-01-27 PROCEDURE — 25010000002 PROPOFOL 10 MG/ML EMULSION: Performed by: NURSE ANESTHETIST, CERTIFIED REGISTERED

## 2023-01-27 PROCEDURE — 25010000002 NEOSTIGMINE 5 MG/10ML SOLUTION: Performed by: NURSE ANESTHETIST, CERTIFIED REGISTERED

## 2023-01-27 PROCEDURE — 25010000002 KETOROLAC TROMETHAMINE PER 15 MG: Performed by: NURSE ANESTHETIST, CERTIFIED REGISTERED

## 2023-01-27 PROCEDURE — 74300 X-RAY BILE DUCTS/PANCREAS: CPT

## 2023-01-27 PROCEDURE — 47563 LAPARO CHOLECYSTECTOMY/GRAPH: CPT | Performed by: SPECIALIST/TECHNOLOGIST, OTHER

## 2023-01-27 PROCEDURE — 88304 TISSUE EXAM BY PATHOLOGIST: CPT | Performed by: SURGERY

## 2023-01-27 PROCEDURE — 82962 GLUCOSE BLOOD TEST: CPT

## 2023-01-27 DEVICE — LIGAMAX 5 MM ENDOSCOPIC MULTIPLE CLIP APPLIER
Type: IMPLANTABLE DEVICE | Site: ABDOMEN | Status: FUNCTIONAL
Brand: LIGAMAX

## 2023-01-27 RX ORDER — OXYCODONE HYDROCHLORIDE AND ACETAMINOPHEN 5; 325 MG/1; MG/1
1 TABLET ORAL EVERY 4 HOURS PRN
Qty: 12 TABLET | Refills: 0 | Status: SHIPPED | OUTPATIENT
Start: 2023-01-27 | End: 2023-02-14

## 2023-01-27 RX ORDER — FAMOTIDINE 10 MG/ML
20 INJECTION, SOLUTION INTRAVENOUS ONCE
Status: COMPLETED | OUTPATIENT
Start: 2023-01-27 | End: 2023-01-27

## 2023-01-27 RX ORDER — LIDOCAINE HYDROCHLORIDE 20 MG/ML
INJECTION, SOLUTION INFILTRATION; PERINEURAL AS NEEDED
Status: DISCONTINUED | OUTPATIENT
Start: 2023-01-27 | End: 2023-01-27 | Stop reason: SURG

## 2023-01-27 RX ORDER — ONDANSETRON 2 MG/ML
4 INJECTION INTRAMUSCULAR; INTRAVENOUS ONCE AS NEEDED
Status: COMPLETED | OUTPATIENT
Start: 2023-01-27 | End: 2023-01-27

## 2023-01-27 RX ORDER — ONDANSETRON 2 MG/ML
INJECTION INTRAMUSCULAR; INTRAVENOUS AS NEEDED
Status: DISCONTINUED | OUTPATIENT
Start: 2023-01-27 | End: 2023-01-27 | Stop reason: SURG

## 2023-01-27 RX ORDER — PROPOFOL 10 MG/ML
VIAL (ML) INTRAVENOUS AS NEEDED
Status: DISCONTINUED | OUTPATIENT
Start: 2023-01-27 | End: 2023-01-27 | Stop reason: SURG

## 2023-01-27 RX ORDER — LABETALOL HYDROCHLORIDE 5 MG/ML
5 INJECTION, SOLUTION INTRAVENOUS
Status: DISCONTINUED | OUTPATIENT
Start: 2023-01-27 | End: 2023-01-27 | Stop reason: HOSPADM

## 2023-01-27 RX ORDER — ROCURONIUM BROMIDE 10 MG/ML
INJECTION, SOLUTION INTRAVENOUS AS NEEDED
Status: DISCONTINUED | OUTPATIENT
Start: 2023-01-27 | End: 2023-01-27 | Stop reason: SURG

## 2023-01-27 RX ORDER — MIDAZOLAM HYDROCHLORIDE 1 MG/ML
1 INJECTION INTRAMUSCULAR; INTRAVENOUS
Status: DISCONTINUED | OUTPATIENT
Start: 2023-01-27 | End: 2023-01-27 | Stop reason: HOSPADM

## 2023-01-27 RX ORDER — SODIUM CHLORIDE 0.9 % (FLUSH) 0.9 %
3 SYRINGE (ML) INJECTION EVERY 12 HOURS SCHEDULED
Status: DISCONTINUED | OUTPATIENT
Start: 2023-01-27 | End: 2023-01-27 | Stop reason: HOSPADM

## 2023-01-27 RX ORDER — CEFAZOLIN SODIUM 2 G/100ML
2 INJECTION, SOLUTION INTRAVENOUS ONCE
Status: DISCONTINUED | OUTPATIENT
Start: 2023-01-27 | End: 2023-01-27 | Stop reason: HOSPADM

## 2023-01-27 RX ORDER — OXYCODONE AND ACETAMINOPHEN 7.5; 325 MG/1; MG/1
1 TABLET ORAL ONCE AS NEEDED
Status: COMPLETED | OUTPATIENT
Start: 2023-01-27 | End: 2023-01-27

## 2023-01-27 RX ORDER — CEFAZOLIN SODIUM 2 G/100ML
2 INJECTION, SOLUTION INTRAVENOUS EVERY 8 HOURS
Status: COMPLETED | OUTPATIENT
Start: 2023-01-27 | End: 2023-01-27

## 2023-01-27 RX ORDER — SODIUM CHLORIDE 0.9 % (FLUSH) 0.9 %
3-10 SYRINGE (ML) INJECTION AS NEEDED
Status: DISCONTINUED | OUTPATIENT
Start: 2023-01-27 | End: 2023-01-27 | Stop reason: HOSPADM

## 2023-01-27 RX ORDER — HYDROMORPHONE HYDROCHLORIDE 1 MG/ML
0.25 INJECTION, SOLUTION INTRAMUSCULAR; INTRAVENOUS; SUBCUTANEOUS
Status: DISCONTINUED | OUTPATIENT
Start: 2023-01-27 | End: 2023-01-27 | Stop reason: HOSPADM

## 2023-01-27 RX ORDER — FENTANYL CITRATE 50 UG/ML
25 INJECTION, SOLUTION INTRAMUSCULAR; INTRAVENOUS
Status: DISCONTINUED | OUTPATIENT
Start: 2023-01-27 | End: 2023-01-27 | Stop reason: HOSPADM

## 2023-01-27 RX ORDER — LIDOCAINE HYDROCHLORIDE 10 MG/ML
0.5 INJECTION, SOLUTION EPIDURAL; INFILTRATION; INTRACAUDAL; PERINEURAL ONCE AS NEEDED
Status: DISCONTINUED | OUTPATIENT
Start: 2023-01-27 | End: 2023-01-27 | Stop reason: HOSPADM

## 2023-01-27 RX ORDER — BUPIVACAINE HYDROCHLORIDE AND EPINEPHRINE 5; 5 MG/ML; UG/ML
INJECTION, SOLUTION EPIDURAL; INTRACAUDAL; PERINEURAL AS NEEDED
Status: DISCONTINUED | OUTPATIENT
Start: 2023-01-27 | End: 2023-01-27 | Stop reason: HOSPADM

## 2023-01-27 RX ORDER — SODIUM CHLORIDE, SODIUM LACTATE, POTASSIUM CHLORIDE, CALCIUM CHLORIDE 600; 310; 30; 20 MG/100ML; MG/100ML; MG/100ML; MG/100ML
9 INJECTION, SOLUTION INTRAVENOUS CONTINUOUS
Status: DISCONTINUED | OUTPATIENT
Start: 2023-01-27 | End: 2023-01-27 | Stop reason: HOSPADM

## 2023-01-27 RX ORDER — MAGNESIUM HYDROXIDE 1200 MG/15ML
LIQUID ORAL AS NEEDED
Status: DISCONTINUED | OUTPATIENT
Start: 2023-01-27 | End: 2023-01-27 | Stop reason: HOSPADM

## 2023-01-27 RX ORDER — HYDRALAZINE HYDROCHLORIDE 20 MG/ML
5 INJECTION INTRAMUSCULAR; INTRAVENOUS
Status: DISCONTINUED | OUTPATIENT
Start: 2023-01-27 | End: 2023-01-27 | Stop reason: HOSPADM

## 2023-01-27 RX ORDER — NEOSTIGMINE METHYLSULFATE 0.5 MG/ML
INJECTION, SOLUTION INTRAVENOUS AS NEEDED
Status: DISCONTINUED | OUTPATIENT
Start: 2023-01-27 | End: 2023-01-27 | Stop reason: SURG

## 2023-01-27 RX ORDER — GLYCOPYRROLATE 0.2 MG/ML
INJECTION INTRAMUSCULAR; INTRAVENOUS AS NEEDED
Status: DISCONTINUED | OUTPATIENT
Start: 2023-01-27 | End: 2023-01-27 | Stop reason: SURG

## 2023-01-27 RX ORDER — FENTANYL CITRATE 50 UG/ML
50 INJECTION, SOLUTION INTRAMUSCULAR; INTRAVENOUS
Status: DISCONTINUED | OUTPATIENT
Start: 2023-01-27 | End: 2023-01-27 | Stop reason: HOSPADM

## 2023-01-27 RX ORDER — DEXAMETHASONE SODIUM PHOSPHATE 4 MG/ML
INJECTION, SOLUTION INTRA-ARTICULAR; INTRALESIONAL; INTRAMUSCULAR; INTRAVENOUS; SOFT TISSUE AS NEEDED
Status: DISCONTINUED | OUTPATIENT
Start: 2023-01-27 | End: 2023-01-27 | Stop reason: SURG

## 2023-01-27 RX ORDER — SODIUM CHLORIDE 9 MG/ML
INJECTION, SOLUTION INTRAVENOUS AS NEEDED
Status: DISCONTINUED | OUTPATIENT
Start: 2023-01-27 | End: 2023-01-27 | Stop reason: HOSPADM

## 2023-01-27 RX ORDER — KETOROLAC TROMETHAMINE 30 MG/ML
INJECTION, SOLUTION INTRAMUSCULAR; INTRAVENOUS AS NEEDED
Status: DISCONTINUED | OUTPATIENT
Start: 2023-01-27 | End: 2023-01-27 | Stop reason: SURG

## 2023-01-27 RX ADMIN — PROPOFOL 200 MG: 10 INJECTION, EMULSION INTRAVENOUS at 10:11

## 2023-01-27 RX ADMIN — HYDROMORPHONE HYDROCHLORIDE 0.25 MG: 1 INJECTION, SOLUTION INTRAMUSCULAR; INTRAVENOUS; SUBCUTANEOUS at 11:43

## 2023-01-27 RX ADMIN — LIDOCAINE HYDROCHLORIDE 60 MG: 20 INJECTION, SOLUTION INFILTRATION; PERINEURAL at 10:11

## 2023-01-27 RX ADMIN — ROCURONIUM BROMIDE 40 MG: 10 INJECTION INTRAVENOUS at 10:11

## 2023-01-27 RX ADMIN — HYDROMORPHONE HYDROCHLORIDE 0.25 MG: 1 INJECTION, SOLUTION INTRAMUSCULAR; INTRAVENOUS; SUBCUTANEOUS at 12:00

## 2023-01-27 RX ADMIN — ONDANSETRON 4 MG: 2 INJECTION INTRAMUSCULAR; INTRAVENOUS at 11:25

## 2023-01-27 RX ADMIN — FENTANYL CITRATE 50 MCG: 50 INJECTION, SOLUTION INTRAMUSCULAR; INTRAVENOUS at 10:08

## 2023-01-27 RX ADMIN — FENTANYL CITRATE 25 MCG: 50 INJECTION, SOLUTION INTRAMUSCULAR; INTRAVENOUS at 11:25

## 2023-01-27 RX ADMIN — GLYCOPYRROLATE 0.8 MCG: 1 INJECTION INTRAMUSCULAR; INTRAVENOUS at 10:41

## 2023-01-27 RX ADMIN — LIDOCAINE HYDROCHLORIDE 60 MG: 20 INJECTION, SOLUTION INFILTRATION; PERINEURAL at 10:51

## 2023-01-27 RX ADMIN — FENTANYL CITRATE 50 MCG: 50 INJECTION, SOLUTION INTRAMUSCULAR; INTRAVENOUS at 10:25

## 2023-01-27 RX ADMIN — SODIUM CHLORIDE, POTASSIUM CHLORIDE, SODIUM LACTATE AND CALCIUM CHLORIDE 9 ML/HR: 600; 310; 30; 20 INJECTION, SOLUTION INTRAVENOUS at 09:30

## 2023-01-27 RX ADMIN — FAMOTIDINE 20 MG: 10 INJECTION INTRAVENOUS at 09:30

## 2023-01-27 RX ADMIN — CEFAZOLIN SODIUM 2 G: 2 INJECTION, SOLUTION INTRAVENOUS at 10:01

## 2023-01-27 RX ADMIN — KETOROLAC TROMETHAMINE 30 MG: 30 INJECTION, SOLUTION INTRAMUSCULAR at 10:42

## 2023-01-27 RX ADMIN — OXYCODONE AND ACETAMINOPHEN 1 TABLET: 7.5; 325 TABLET ORAL at 11:25

## 2023-01-27 RX ADMIN — DEXAMETHASONE SODIUM PHOSPHATE 8 MG: 4 INJECTION, SOLUTION INTRAMUSCULAR; INTRAVENOUS at 10:18

## 2023-01-27 RX ADMIN — ONDANSETRON 4 MG: 2 INJECTION INTRAMUSCULAR; INTRAVENOUS at 10:36

## 2023-01-27 RX ADMIN — FENTANYL CITRATE 25 MCG: 50 INJECTION, SOLUTION INTRAMUSCULAR; INTRAVENOUS at 11:30

## 2023-01-27 RX ADMIN — NEOSTIGMINE METHYLSULFATE 4 MG: 0.5 INJECTION INTRAVENOUS at 10:41

## 2023-01-27 NOTE — ANESTHESIA POSTPROCEDURE EVALUATION
"Patient: Clair Overton    Procedure Summary     Date: 01/27/23 Room / Location: [facility] OR 02 / [facility] MAIN OR    Anesthesia Start: 1006 Anesthesia Stop: 1108    Procedure: LAPAROSCOPIC CHOLECYSTECTOMY WITH INTRAOPERATIVE CHOLANGIOGRAM, POSSIBLE OPEN (Abdomen) Diagnosis:       Calculus of gallbladder with chronic cholecystitis without obstruction      (Calculus of gallbladder with chronic cholecystitis without obstruction [K80.10])    Surgeons: Mary Blanton MD Provider: Stephen Ferrer MD    Anesthesia Type: general ASA Status: 2          Anesthesia Type: general    Vitals  Vitals Value Taken Time   /81 01/27/23 1216   Temp 36.8 °C (98.2 °F) 01/27/23 1106   Pulse 86 01/27/23 1217   Resp 16 01/27/23 1115   SpO2 94 % 01/27/23 1217   Vitals shown include unvalidated device data.        Post Anesthesia Care and Evaluation    Patient location during evaluation: bedside  Patient participation: complete - patient participated  Level of consciousness: awake and alert  Pain management: adequate    Airway patency: patent  Anesthetic complications: No anesthetic complications  PONV Status: none  Cardiovascular status: acceptable  Respiratory status: acceptable  Hydration status: acceptable    Comments: /76   Pulse 76   Temp 36.8 °C (98.2 °F) (Oral)   Resp 16   Ht 167.6 cm (66\")   Wt 115 kg (253 lb 3.2 oz)   LMP 01/13/2023   SpO2 94%   BMI 40.87 kg/m²         "

## 2023-01-27 NOTE — ANESTHESIA PROCEDURE NOTES
Airway  Urgency: elective    Date/Time: 1/27/2023 10:15 AM  Airway not difficult    General Information and Staff    Patient location during procedure: OR  Anesthesiologist: Stephen Ferrer MD  CRNA/CAA: Yanni Bansal CRNA    Indications and Patient Condition  Indications for airway management: airway protection    Preoxygenated: yes  Mask difficulty assessment: 2 - vent by mask + OA or adjuvant +/- NMBA    Final Airway Details  Final airway type: endotracheal airway      Successful airway: ETT  Cuffed: yes   Successful intubation technique: direct laryngoscopy  Facilitating devices/methods: intubating stylet  Endotracheal tube insertion site: oral  Blade: Cherrie  Blade size: 3  ETT size (mm): 7.0  Cormack-Lehane Classification: grade I - full view of glottis  Placement verified by: chest auscultation and capnometry   Inital cuff pressure (cm H2O): 20  Cuff volume (mL): 7  Measured from: lips  ETT/EBT  to lips (cm): 21  Number of attempts at approach: 1  Assessment: lips, teeth, and gum same as pre-op and atraumatic intubation

## 2023-01-27 NOTE — ANESTHESIA PREPROCEDURE EVALUATION
Anesthesia Evaluation     Patient summary reviewed and Nursing notes reviewed   no history of anesthetic complications:  NPO Solid Status: > 8 hours  NPO Liquid Status: > 2 hours           Airway   Mallampati: II  TM distance: >3 FB  Neck ROM: full  no difficulty expected  Dental - normal exam     Pulmonary - normal exam   (+) a smoker Current Abstained day of surgery,   (-) COPD, asthma, lung cancer  Cardiovascular - negative cardio ROS and normal exam  Exercise tolerance: good (4-7 METS)    Rhythm: regular  Rate: normal    (-) hypertension, valvular problems/murmurs, past MI, CAD, dysrhythmias, angina, cardiac stents, CABG      Neuro/Psych  (+) psychiatric history Anxiety and Depression,    (-) seizures, TIA, CVA  GI/Hepatic/Renal/Endo    (+) obesity,  GERD well controlled,    (-) PUD, hepatitis, liver disease, no renal disease, diabetes, GI bleed, no thyroid disorder    Musculoskeletal     (+) back pain,   Abdominal  - normal exam   Substance History - negative use     OB/GYN          Other - negative ROS                       Anesthesia Plan    ASA 2     general     intravenous induction     Anesthetic plan, risks, benefits, and alternatives have been provided, discussed and informed consent has been obtained with: patient and spouse/significant other.    Plan discussed with CRNA.        CODE STATUS:

## 2023-01-30 LAB
LAB AP CASE REPORT: NORMAL
PATH REPORT.FINAL DX SPEC: NORMAL
PATH REPORT.GROSS SPEC: NORMAL

## 2023-02-14 ENCOUNTER — OFFICE VISIT (OUTPATIENT)
Dept: SURGERY | Facility: CLINIC | Age: 23
End: 2023-02-14
Payer: OTHER GOVERNMENT

## 2023-02-14 VITALS — WEIGHT: 253 LBS | BODY MASS INDEX: 40.66 KG/M2 | HEIGHT: 66 IN

## 2023-02-14 DIAGNOSIS — K80.10 CALCULUS OF GALLBLADDER WITH CHRONIC CHOLECYSTITIS WITHOUT OBSTRUCTION: ICD-10-CM

## 2023-02-14 DIAGNOSIS — Z09 SURGICAL FOLLOWUP: Primary | ICD-10-CM

## 2023-02-14 PROCEDURE — 99024 POSTOP FOLLOW-UP VISIT: CPT | Performed by: SURGERY

## 2023-02-14 NOTE — PROGRESS NOTES
CHIEF COMPLAINT:   Chief Complaint   Patient presents with   • Post-op     Laparoscopic cholecystectomy with cholangiogram  01/27/23       HISTORY OF PRESENT ILLNESS:  This is a 22 y.o. female who presents for a post-operative visit after undergoing laparoscopic cholecystectomy with cholangiogram on 1/27/2023.  She has had mild diarrhea, but nothing lifestyle limiting.  She denies any fever, chills, jaundice, or scleral icterus.  She is concerned about a couple of the incisions where the Vicryl suture is starting to come to the surface with exposed knots.  She has been unable to grasp the knots with tweezers at home.    Pathology:   Gallbladder, Cholecystectomy:                A. Chronic cholecystitis with cholelithiasis and focal cholesterolosis.    PHYSICAL EXAM:  Lungs: Clear  Heart: RRR  ABD: Incisions are healing well without any erythema or signs of infection, there are a few exposed white Vicryl suture knots embedded in the skin unable to be grasped  Ext: no significant edema, calves nontender    A/P:  This is a 22 y.o. female patient who is S/P laparoscopic cholecystectomy with cholangiogram on 1/27/2023    She is healing well.  I discussed the benign pathology findings with her.  I tried to remove a few of the exposed Vicryl suture knots but was unable to grasp them with the forceps and the suture movable kit.  I sent her home with a suture removal kit and advised her to keep an eye on those knots and pull them out once they are more superficial.  She can follow-up with me on an as-needed basis.    Mary Blanton MD  General, Robotic, and Endoscopic Surgery  [facility]    [address], [address]  [place] [address]  P: [phone]  F: [phone]

## 2023-03-14 ENCOUNTER — OFFICE VISIT (OUTPATIENT)
Dept: FAMILY MEDICINE CLINIC | Facility: CLINIC | Age: 23
End: 2023-03-14
Payer: OTHER GOVERNMENT

## 2023-03-14 VITALS
SYSTOLIC BLOOD PRESSURE: 126 MMHG | HEART RATE: 81 BPM | TEMPERATURE: 98 F | HEIGHT: 66 IN | WEIGHT: 259 LBS | DIASTOLIC BLOOD PRESSURE: 82 MMHG | BODY MASS INDEX: 41.62 KG/M2

## 2023-03-14 DIAGNOSIS — R00.2 PALPITATIONS: ICD-10-CM

## 2023-03-14 DIAGNOSIS — R06.83 SNORING: ICD-10-CM

## 2023-03-14 DIAGNOSIS — J30.9 ALLERGIC RHINITIS, UNSPECIFIED SEASONALITY, UNSPECIFIED TRIGGER: ICD-10-CM

## 2023-03-14 DIAGNOSIS — R06.81 WITNESSED EPISODE OF APNEA: ICD-10-CM

## 2023-03-14 DIAGNOSIS — R51.9 NONINTRACTABLE EPISODIC HEADACHE, UNSPECIFIED HEADACHE TYPE: Primary | ICD-10-CM

## 2023-03-14 DIAGNOSIS — F33.2 SEVERE EPISODE OF RECURRENT MAJOR DEPRESSIVE DISORDER, WITHOUT PSYCHOTIC FEATURES: ICD-10-CM

## 2023-03-14 DIAGNOSIS — F41.9 ANXIETY: ICD-10-CM

## 2023-03-14 DIAGNOSIS — L30.4 INTERTRIGO: ICD-10-CM

## 2023-03-14 PROCEDURE — 99214 OFFICE O/P EST MOD 30 MIN: CPT | Performed by: NURSE PRACTITIONER

## 2023-03-14 RX ORDER — ESCITALOPRAM OXALATE 10 MG/1
10 TABLET ORAL DAILY
Qty: 90 TABLET | Refills: 1 | Status: SHIPPED | OUTPATIENT
Start: 2023-03-14

## 2023-03-14 NOTE — PROGRESS NOTES
Subjective   Clair Overton is a 22 y.o. female.     Chief Complaint   Patient presents with   • Anxiety     Follow up          History of Present Illness   Patient presents for follow up anxiety: takes Lexapro daily and helps; some days hard, but has found other ways to cope with stress; some feeling down at times, but not as bad as in past; happy with current dose; some trouble staying asleep at times; has been walking daily and has helped; some overeating; feels down about weight at times; fidgety; has worry about going out in public; see PHQ-9 and HAO-7; no SI/HI.    F/U NATALIA: had gall bladder removed in January and no longer taking Pantoprazole daily; all symptoms resolved after removal; occasional diarrhea after eating, but tolerable.    Will get redness and itching under breasts; has a lot of moisture; uses Nystatin powder and helps some; has started wearing new brace due to discomfort in neck and back due to breasts heavy; neck and back feel better if does not wear a bra, but then will get a rash.    Also c/o headaches; has been taking Tylenol; no change in vision; has had some light sensitivity; will have some nausea, but no vomiting; moving around will worsen headache at times; no ear pain; no sore throat; no runny/stuffy nose; no fever; no recent eye exam; not sure if snores but has noted will wake up due to stops breathing.    Has been craving salt recently.      The following portions of the patient's history were reviewed and updated as appropriate: allergies, current medications, past family history, past medical history, past social history, past surgical history and problem list.      Current Outpatient Medications   Medication Sig Dispense Refill   • escitalopram (LEXAPRO) 10 MG tablet Take 1 tablet by mouth Daily. 90 tablet 1   • nystatin (MYCOSTATIN) 212401 UNIT/GM powder Apply  topically to the appropriate area as directed 3 (Three) Times a Day. 30 g 0     No current facility-administered  medications for this visit.       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   • Intertrigo     using nystatin   • Shoulder joint pain 08/05/2013       Past Surgical History:   Procedure Laterality Date   • CHOLECYSTECTOMY WITH INTRAOPERATIVE CHOLANGIOGRAM N/A 01/27/2023    Procedure: LAPAROSCOPIC CHOLECYSTECTOMY WITH INTRAOPERATIVE CHOLANGIOGRAM, POSSIBLE OPEN;  Surgeon: Mary Blanton MD;  Location: [facility] MAIN OR;  Service: General;  Laterality: N/A;   • ENDOSCOPY N/A 09/21/2022    Procedure: ESOPHAGOGASTRODUODENOSCOPY WITH COLD BIOPSIES AND WITH BALLOON DILATATION 18MM-20MM;  Surgeon: Luis A Galindo MD;  Location: [facility] MAIN OR;  Service: Gastroenterology;  Laterality: N/A;  S. RING, ESOPHAGITIS       Family History   Problem Relation Age of Onset   • Breast cancer Mother 45   • Ulcerative colitis Father    • Arthritis Father    • Irritable bowel syndrome Maternal Grandmother    • Diabetes Maternal Grandmother    • Hypertension Maternal Grandmother    • Thyroid disease Maternal Grandmother    • Migraines Maternal Grandmother    • Heart failure Maternal Grandfather    • Diabetes Maternal Grandfather    • Osteoarthritis Maternal Grandfather    • Asthma Maternal Grandfather    • Hyperlipidemia Maternal Grandfather    • Hypertension Maternal Grandfather    • Colon cancer Paternal Grandfather    • Colon polyps Neg Hx    • Crohn's disease Neg Hx    • Malig Hyperthermia Neg Hx        Social History     Socioeconomic History   • Marital status:    Tobacco Use   • Smoking status: Never   • Smokeless tobacco: Never   Vaping Use   • Vaping Use: Every day   • Substances: Nicotine   Substance and Sexual Activity   • Alcohol use: No   • Drug use: Never   • Sexual activity: Not  Currently     Trying to stop vaping.    Review of Systems   Constitutional: Positive for fatigue. Negative for appetite change, fever, unexpected weight gain and unexpected weight loss.   HENT: Positive for postnasal drip. Negative for trouble swallowing. Sinus pressure: some recently, but resolved.    Respiratory: Negative for cough, chest tightness and shortness of breath.    Cardiovascular: Negative for chest pain. Palpitations: some, seems random, no change; never heard about appointment with cardiology.   Gastrointestinal: Negative for abdominal pain and blood in stool. Diarrhea: see HPI.   Endocrine: Negative for polydipsia.   Genitourinary: Negative for dysuria and frequency.   Musculoskeletal: Back pain: see HPI; has fractures in lower back.   Skin: Negative for rash.   Neurological: Negative for dizziness and light-headedness (some if stands up too fast). Headache: see HPI.   Hematological: Does not bruise/bleed easily.   Psychiatric/Behavioral: Negative for suicidal ideas.     PHQ-9 Depression Screening  Little interest or pleasure in doing things? 0-->not at all   Feeling down, depressed, or hopeless? 2-->more than half the days   Trouble falling or staying asleep, or sleeping too much? 1-->several days   Feeling tired or having little energy? 0-->not at all   Poor appetite or overeating? 1-->several days   Feeling bad about yourself - or that you are a failure or have let yourself or your family down? 1-->several days   Trouble concentrating on things, such as reading the newspaper or watching television? 0-->not at all   Moving or speaking so slowly that other people could have noticed? Or the opposite - being so fidgety or restless that you have been moving around a lot more than usual? 2-->more than half the days   Thoughts that you would be better off dead, or of hurting yourself in some way? 0-->not at all   PHQ-9 Total Score 7   If you checked off any problems, how difficult have these problems  "made it for you to do your work, take care of things at home, or get along with other people? somewhat difficult       HAO-7 anxiety score: 2    Objective   Vitals:    03/14/23 1534   BP: 126/82   BP Location: Left arm   Patient Position: Sitting   Cuff Size: Adult   Pulse: 81   Temp: 98 °F (36.7 °C)   Weight: 117 kg (259 lb)   Height: 167.6 cm (66\")     Body mass index is 41.8 kg/m².    Physical Exam  Vitals and nursing note reviewed.   Constitutional:       General: She is not in acute distress.     Appearance: She is well-developed and well-groomed. She is not diaphoretic.   HENT:      Head: Normocephalic.      Right Ear: External ear normal. No decreased hearing noted. Right ear middle ear effusion: mild. Right ear erythematous TM: mild.      Left Ear: External ear normal. No decreased hearing noted. Left ear middle ear effusion: mild. Tympanic membrane is not erythematous.      Nose: Nose normal.      Right Sinus: No maxillary sinus tenderness or frontal sinus tenderness.      Left Sinus: No maxillary sinus tenderness or frontal sinus tenderness.      Mouth/Throat:      Mouth: Mucous membranes are moist.      Pharynx: No oropharyngeal exudate (cobblestoning present) or posterior oropharyngeal erythema.   Eyes:      Conjunctiva/sclera: Conjunctivae normal.   Neck:      Vascular: No carotid bruit.   Cardiovascular:      Rate and Rhythm: Normal rate and regular rhythm.      Pulses: Normal pulses.      Heart sounds: Normal heart sounds. No murmur heard.  Pulmonary:      Effort: Pulmonary effort is normal. No respiratory distress.      Breath sounds: Normal breath sounds.   Abdominal:      General: Bowel sounds are normal.      Palpations: Abdomen is soft. There is no hepatomegaly or splenomegaly.      Tenderness: There is no abdominal tenderness. There is no guarding.   Musculoskeletal:      Cervical back: Normal range of motion and neck supple.      Right lower leg: No edema.      Left lower leg: No edema. "   Lymphadenopathy:      Cervical: No cervical adenopathy.   Skin:     General: Skin is warm and dry.   Neurological:      Mental Status: She is alert and oriented to person, place, and time.      Gait: Gait is intact.   Psychiatric:         Mood and Affect: Mood normal.         Behavior: Behavior normal.         Thought Content: Thought content normal.         Cognition and Memory: Cognition normal.         Judgment: Judgment normal.         Lab Results   Component Value Date    WBC 7.1 05/11/2022    RBC 4.99 05/11/2022    HGB 13.2 05/11/2022    HCT 42.0 05/11/2022    MCV 84 05/11/2022    MCH 26.5 (L) 05/11/2022    MCHC 31.4 (L) 05/11/2022    RDW 13.1 05/11/2022     05/11/2022    NEUTRORELPCT 63 05/11/2022    LYMPHORELPCT 28 05/11/2022    MONORELPCT 6 05/11/2022    EOSRELPCT 2 05/11/2022    BASORELPCT 1 05/11/2022    NEUTROABS 4.4 05/11/2022    LYMPHSABS 2.0 05/11/2022    MONOSABS 0.4 05/11/2022    EOSABS 0.2 05/11/2022    BASOSABS 0.1 05/11/2022     Lab Results   Component Value Date    GLUCOSE 84 05/11/2022    BUN 15 05/11/2022    CREATININE 0.73 05/11/2022    BCR 21 05/11/2022    K 4.9 05/11/2022    CO2 23 05/11/2022    CALCIUM 9.7 05/11/2022    PROTENTOTREF 6.9 05/11/2022    ALBUMIN 4.3 05/11/2022    LABIL2 1.7 05/11/2022    AST 12 05/11/2022    ALT 13 05/11/2022      Lab Results   Component Value Date    CHLPL 171 05/11/2022    TRIG 90 05/11/2022    HDL 43 05/11/2022    VLDL 17 05/11/2022     (H) 05/11/2022     Lab Results   Component Value Date    TSH 1.010 05/11/2022     Lab Results   Component Value Date    HGBA1C 5.2 05/11/2022         Assessment    Problem List Items Addressed This Visit     Anxiety    Current Assessment & Plan     Stable.  Continue Escitalopram daily.  Continue regular exercise.         Relevant Medications    escitalopram (LEXAPRO) 10 MG tablet    Severe episode of recurrent major depressive disorder, without psychotic features (HCC)    Current Assessment & Plan      Patient's depression is recurrent and is moderate without psychosis. Their depression is currently active and the condition is improving with treatment. This will be reassessed at the next regular appointment. F/U as described:patient will continue current medication therapy.  Continue Escitalopram daily.         Relevant Medications    escitalopram (LEXAPRO) 10 MG tablet    Palpitations    Relevant Orders    Ambulatory Referral to Cardiology    TSH Rfx On Abnormal To Free T4    Comprehensive Metabolic Panel    CBC & Differential    Allergic rhinitis    Current Assessment & Plan     Try over the counter antihistamine, such as Claritin or Allegra, daily.         Intertrigo    Current Assessment & Plan     Stable.  Continue Nystatin powder.         Nonintractable episodic headache - Primary    Current Assessment & Plan     Keep headache diary.  Continue increased intake of clear liquids.  Try over the counter antihistamine, such as Claritin or Allegra, daily.         Witnessed episode of apnea    Relevant Orders    Ambulatory Referral to Sleep Medicine    Snoring    Relevant Orders    Ambulatory Referral to Sleep Medicine          Return in about 6 months (around 9/14/2023) for Annual physical, Recheck.or sooner if symptoms persist or worsen.  Discussed possible sleep apnea may be contributing to headaches; referred to sleep medicine for further evaluation; also discussed allergies may be contributing to headache and will try daily antihistamine.       COVID-19 Precautions - Patient was compliant in wearing a mask. When I saw the patient, I used appropriate personal protective equipment (PPE) including mask, gloves, and eye shield (standard procedure).  Hand hygiene was completed before and after seeing the patient.

## 2023-03-14 NOTE — PATIENT INSTRUCTIONS
Keep headache diary.  Continue increased intake of clear liquids.  Try over the counter antihistamine, such as Claritin or Allegra, daily.  Continue to work on healthy diet and exercise.  Follow up pending lab results.  Follow up in 6 months for physical with fasting labs, or sooner if symptoms persist or worsen.

## 2023-03-15 PROBLEM — R06.83 SNORING: Status: ACTIVE | Noted: 2023-03-15

## 2023-03-15 PROBLEM — K80.10 CALCULUS OF GALLBLADDER WITH CHRONIC CHOLECYSTITIS WITHOUT OBSTRUCTION: Status: RESOLVED | Noted: 2023-01-12 | Resolved: 2023-03-15

## 2023-03-15 PROBLEM — R11.0 NAUSEA: Status: RESOLVED | Noted: 2022-07-13 | Resolved: 2023-03-15

## 2023-03-15 PROBLEM — R51.9 NONINTRACTABLE EPISODIC HEADACHE: Status: ACTIVE | Noted: 2023-03-15

## 2023-03-15 PROBLEM — M94.0 COSTOCHONDRITIS: Status: RESOLVED | Noted: 2022-12-17 | Resolved: 2023-03-15

## 2023-03-15 PROBLEM — K30 INDIGESTION: Status: RESOLVED | Noted: 2022-06-14 | Resolved: 2023-03-15

## 2023-03-15 PROBLEM — R06.81 WITNESSED EPISODE OF APNEA: Status: ACTIVE | Noted: 2023-03-15

## 2023-03-15 LAB
ALBUMIN SERPL-MCNC: 4 G/DL (ref 3.9–5)
ALBUMIN/GLOB SERPL: 1.6 {RATIO} (ref 1.2–2.2)
ALP SERPL-CCNC: 88 IU/L (ref 44–121)
ALT SERPL-CCNC: 10 IU/L (ref 0–32)
AST SERPL-CCNC: 13 IU/L (ref 0–40)
BASOPHILS # BLD AUTO: 0.1 X10E3/UL (ref 0–0.2)
BASOPHILS NFR BLD AUTO: 1 %
BILIRUB SERPL-MCNC: 0.5 MG/DL (ref 0–1.2)
BUN SERPL-MCNC: 12 MG/DL (ref 6–20)
BUN/CREAT SERPL: 16 (ref 9–23)
CALCIUM SERPL-MCNC: 9 MG/DL (ref 8.7–10.2)
CHLORIDE SERPL-SCNC: 104 MMOL/L (ref 96–106)
CO2 SERPL-SCNC: 26 MMOL/L (ref 20–29)
CREAT SERPL-MCNC: 0.75 MG/DL (ref 0.57–1)
EGFRCR SERPLBLD CKD-EPI 2021: 114 ML/MIN/1.73
EOSINOPHIL # BLD AUTO: 0.2 X10E3/UL (ref 0–0.4)
EOSINOPHIL NFR BLD AUTO: 3 %
ERYTHROCYTE [DISTWIDTH] IN BLOOD BY AUTOMATED COUNT: 13.2 % (ref 11.7–15.4)
GLOBULIN SER CALC-MCNC: 2.5 G/DL (ref 1.5–4.5)
GLUCOSE SERPL-MCNC: 91 MG/DL (ref 70–99)
HCT VFR BLD AUTO: 40 % (ref 34–46.6)
HGB BLD-MCNC: 12.6 G/DL (ref 11.1–15.9)
IMM GRANULOCYTES # BLD AUTO: 0 X10E3/UL (ref 0–0.1)
IMM GRANULOCYTES NFR BLD AUTO: 0 %
LYMPHOCYTES # BLD AUTO: 2.1 X10E3/UL (ref 0.7–3.1)
LYMPHOCYTES NFR BLD AUTO: 31 %
MCH RBC QN AUTO: 25.9 PG (ref 26.6–33)
MCHC RBC AUTO-ENTMCNC: 31.5 G/DL (ref 31.5–35.7)
MCV RBC AUTO: 82 FL (ref 79–97)
MONOCYTES # BLD AUTO: 0.5 X10E3/UL (ref 0.1–0.9)
MONOCYTES NFR BLD AUTO: 8 %
NEUTROPHILS # BLD AUTO: 3.8 X10E3/UL (ref 1.4–7)
NEUTROPHILS NFR BLD AUTO: 57 %
PLATELET # BLD AUTO: 377 X10E3/UL (ref 150–450)
POTASSIUM SERPL-SCNC: 4.2 MMOL/L (ref 3.5–5.2)
PROT SERPL-MCNC: 6.5 G/DL (ref 6–8.5)
RBC # BLD AUTO: 4.87 X10E6/UL (ref 3.77–5.28)
SODIUM SERPL-SCNC: 141 MMOL/L (ref 134–144)
TSH SERPL DL<=0.005 MIU/L-ACNC: 0.81 UIU/ML (ref 0.45–4.5)
WBC # BLD AUTO: 6.8 X10E3/UL (ref 3.4–10.8)

## 2023-03-15 NOTE — ASSESSMENT & PLAN NOTE
Keep headache diary.  Continue increased intake of clear liquids.  Try over the counter antihistamine, such as Claritin or Allegra, daily.

## 2023-03-15 NOTE — ASSESSMENT & PLAN NOTE
Patient's depression is recurrent and is moderate without psychosis. Their depression is currently active and the condition is improving with treatment. This will be reassessed at the next regular appointment. F/U as described:patient will continue current medication therapy.  Continue Escitalopram daily.

## 2023-05-01 ENCOUNTER — OFFICE VISIT (OUTPATIENT)
Dept: SLEEP MEDICINE | Facility: HOSPITAL | Age: 23
End: 2023-05-01
Payer: OTHER GOVERNMENT

## 2023-05-01 VITALS
HEIGHT: 66 IN | HEART RATE: 84 BPM | OXYGEN SATURATION: 98 % | SYSTOLIC BLOOD PRESSURE: 131 MMHG | BODY MASS INDEX: 41.5 KG/M2 | DIASTOLIC BLOOD PRESSURE: 63 MMHG | WEIGHT: 258.2 LBS

## 2023-05-01 DIAGNOSIS — E66.9 OBESITY (BMI 30-39.9): ICD-10-CM

## 2023-05-01 DIAGNOSIS — R06.83 SNORING: ICD-10-CM

## 2023-05-01 DIAGNOSIS — R06.89 GASPING FOR BREATH: Primary | ICD-10-CM

## 2023-05-01 DIAGNOSIS — R06.81 WITNESSED EPISODE OF APNEA: ICD-10-CM

## 2023-05-01 PROCEDURE — G0463 HOSPITAL OUTPT CLINIC VISIT: HCPCS

## 2023-05-01 RX ORDER — ZOLPIDEM TARTRATE 5 MG/1
5 TABLET ORAL NIGHTLY PRN
Qty: 1 TABLET | Refills: 0 | Status: SHIPPED | OUTPATIENT
Start: 2023-05-01

## 2023-05-01 NOTE — PROGRESS NOTES
"[facility]  Telephone: [phone] / Fax: [phone] [place]  Telephone: [phone] / Fax: [phone] Giulia Dodson    Referring Physician: Matilda Hamlin APRN  PCP: Matilda Hamlin APRN    Reason for consult:  sleep apnea    Clair Overton is a 22 y.o.female  was seen in the Sleep Disorders Center today for evaluation of sleep apnea. She has been dealing with RLS since 2015 back injury(fracture L4 L5).  She goes to bed at 9-10pm. It takes few hours to fall asleep. She wakes up between 6-9am. She gained  lbs in past 5 years. She has recurrent sleep disturbances, choking, gasping for breath. She feels that her throat is tightening up. She reports heart palpitations, poor memory and difficulty concentrating.    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    Clair Overton  has a past medical history of Anxiety, Back pain, Calculus of gallbladder with chronic cholecystitis without obstruction (01/12/2023), Gestational diabetes mellitus in pregnancy, Heart palpitations, Instability of right shoulder joint (08/23/2013), Intertrigo, and Shoulder joint pain (08/05/2013).    Current Medications:    Current Outpatient Medications:   •  escitalopram (LEXAPRO) 10 MG tablet, Take 1 tablet by mouth Daily., Disp: 90 tablet, Rfl: 1  •  nystatin (MYCOSTATIN) 086939 UNIT/GM powder, Apply  topically to the appropriate area as directed 3 (Three) Times a Day., Disp: 30 g, Rfl: 0    I have reviewed Past Medical History, Past Surgical History, Medication List, Social History and Family History as entered in Sleep Questionnaire and EPIC.    ESS  8   Vital Signs /63   Pulse 84   Ht 167.6 cm (66\")   Wt 117 kg (258 lb 3.2 oz)   SpO2 98%   BMI 41.67 kg/m²  Body mass index is 41.67 kg/m².    General Alert and oriented. No " acute distress noted   Pharynx/Throat Class IV  Mallampati airway, large tongue, no evidence of redundant lateral pharyngeal tissue. No oral lesions. No thrush. Moist mucous membranes.   Head Normocephalic. Symmetrical. Atraumatic.    Nose No septal deviation. No drainage   Chest Wall Normal shape. Symmetric expansion with respiration. No tenderness.   Neck Trachea midline, no thyromegaly or adenopathy    Lungs Clear to auscultation bilaterally. No wheezes. No rhonchi. No rales. Respirations regular, even and unlabored.   Heart Regular rhythm and normal rate. Normal S1 and S2. No murmur   Abdomen Soft, non-tender and non-distended. Normal bowel sounds. No masses.   Extremities Moves all extremities well. No edema   Psychiatric Normal mood and affect.        Impression:  1. Gasping for breath    2. Snoring    3. Witnessed episode of apnea    4. Obesity (BMI 30-39.9)          Plan:  I scheduled in lab PSG.  discussed the pathophysiology of obstructive sleep apnea with the patient.  We discussed the adverse outcomes associated with untreated sleep-disordered breathing.  We discussed treatment modalities of obstructive sleep apnea including CPAP device. Sleep study will be scheduled to establish a definitive diagnosis of sleep disorder breathing.  Weight loss will be strongly beneficial in order to reduce the severity of sleep-disordered breathing.  Patient has narrow oropharyngeal structure.  Caution during activities that require prolonged concentration is strongly advised.  After sleep study results are available, patient will be notified, and appointment will be scheduled to discuss sleep study results and treatment recommendations.    Rx for Ambien 5mg x 1 was provided for in lab polysomnogram. Patient was instructed to bring the Ambien tablet to the sleep lab and take at lights out . Strict instructions were given to NOT take the Ambien at home.          I appreciate the opportunity to participate in this  patient's care.      MARY Pierce  [facility]  Phone: [phone]      Part of this note may be an electronic transcription/translation of spoken language to printed text using the Dragon Dictation System. Some errors may exist even though the document was edited.

## 2023-06-16 ENCOUNTER — OFFICE VISIT (OUTPATIENT)
Dept: CARDIOLOGY | Facility: CLINIC | Age: 23
End: 2023-06-16
Payer: OTHER GOVERNMENT

## 2023-06-16 ENCOUNTER — TELEPHONE (OUTPATIENT)
Dept: CARDIOLOGY | Facility: CLINIC | Age: 23
End: 2023-06-16
Payer: OTHER GOVERNMENT

## 2023-06-16 VITALS
HEART RATE: 90 BPM | WEIGHT: 255 LBS | BODY MASS INDEX: 40.98 KG/M2 | HEIGHT: 66 IN | SYSTOLIC BLOOD PRESSURE: 102 MMHG | DIASTOLIC BLOOD PRESSURE: 80 MMHG

## 2023-06-16 DIAGNOSIS — R00.2 PALPITATIONS: Primary | ICD-10-CM

## 2023-06-16 PROCEDURE — 93000 ELECTROCARDIOGRAM COMPLETE: CPT | Performed by: INTERNAL MEDICINE

## 2023-06-16 PROCEDURE — 99203 OFFICE O/P NEW LOW 30 MIN: CPT | Performed by: INTERNAL MEDICINE

## 2023-06-16 NOTE — PROGRESS NOTES
Subjective:     Encounter Date:06/16/23      Patient ID: Clair Overton is a 22 y.o. female.    Chief Complaint:  History of Present Illness    Dear Matilda,    I had the pleasure of seeing this patient in the office today for initial evaluation and consultation.  I appreciate that you sent her in to see us.  They come in today to be seen for evaluation of palpitations.    Patient has had a long history of having intermittent palpitations.  Not triggered with any activity or exercise.  She says it will hit suddenly, with maybe a second or she feels like her heart counter drops and then beats.  Then for several seconds she can just feel very bad.  She will feel presyncopal at the time.  Most significant time was when she was doing laundry and she said it almost knocked her backwards and she felt like she would just have to lay down.  No chest pain or chest discomfort.  No other illnesses.  She has been active without difficulty.    She was evaluated at [place] in 2021.  She had a Holter monitor which she never got the results.  She said no one ever called her.    This patient has no known cardiac history.  This patient has no history of coronary artery disease, congestive heart failure, rheumatic fever, rheumatic heart disease, congenital heart disease or heart murmur.  This patient has never required invasive cardiovascular evaluation.        The following portions of the patient's history were reviewed and updated as appropriate: allergies, current medications, past family history, past medical history, past social history, past surgical history and problem list.      ECG 12 Lead    Date/Time: 6/16/2023 4:06 PM  Performed by: Don Ross III, MD  Authorized by: Don Ross III, MD   Comparison: compared with previous ECG   Similar to previous ECG  Rhythm: sinus rhythm  Rate: normal  Conduction: conduction normal  ST Segments: ST segments normal  T Waves: T waves normal  QRS axis: normal  Other: no  "other findings    Clinical impression: normal ECG               Objective:     Vitals:    06/16/23 1006   BP: 102/80   Pulse: 90   Weight: 116 kg (255 lb)   Height: 167.6 cm (66\")     Body mass index is 41.16 kg/m².      Vitals reviewed.   Constitutional:       General: Not in acute distress.     Appearance: Well-developed. Not diaphoretic.   Eyes:      General:         Right eye: No discharge.         Left eye: No discharge.      Conjunctiva/sclera: Conjunctivae normal.   HENT:      Head: Normocephalic and atraumatic.      Nose: Nose normal.   Neck:      Thyroid: No thyromegaly.      Trachea: No tracheal deviation.   Pulmonary:      Effort: Pulmonary effort is normal. No respiratory distress.      Breath sounds: Normal breath sounds. No stridor.   Chest:      Chest wall: Not tender to palpatation.   Cardiovascular:      Normal rate. Regular rhythm.      Murmurs: There is no murmur.      . No S3 gallop. No click. No rub.   Pulses:     Intact distal pulses.   Abdominal:      General: Bowel sounds are normal. There is no distension.      Palpations: Abdomen is soft. There is no abdominal mass.   Musculoskeletal: Normal range of motion.         General: No tenderness or deformity.      Cervical back: Normal range of motion and neck supple. Skin:     General: Skin is warm and dry.      Findings: No erythema or rash.   Neurological:      Mental Status: Alert.   Psychiatric:         Attention and Perception: Attention normal.         Data and records reviewed:     Lab Results   Component Value Date    GLUCOSE 91 03/14/2023    BUN 12 03/14/2023    CREATININE 0.75 03/14/2023    EGFRRESULT 114 03/14/2023    BCR 16 03/14/2023    K 4.2 03/14/2023    CO2 26 03/14/2023    CALCIUM 9.0 03/14/2023    PROTENTOTREF 6.5 03/14/2023    ALBUMIN 4.0 03/14/2023    BILITOT 0.5 03/14/2023    AST 13 03/14/2023    ALT 10 03/14/2023     No results found for: CHOL  Lab Results   Component Value Date    TRIG 90 05/11/2022     Lab Results "   Component Value Date    HDL 43 05/11/2022     Lab Results   Component Value Date     (H) 05/11/2022     Lab Results   Component Value Date    VLDL 17 05/11/2022     Lab Results   Component Value Date    LDLHDL 2.6 05/11/2022     CBC        3/14/2023    16:15   CBC   WBC 6.8    RBC 4.87    Hemoglobin 12.6    Hematocrit 40.0    MCV 82    MCH 25.9    MCHC 31.5    RDW 13.2    Platelets 377      No radiology results for the last 90 days.          Assessment:          Diagnosis Plan   1. Palpitations  ECG 12 Lead             Plan:       1.  Palpitations- prior evaluation was apparently nondiagnostic.  She apparently wore a monitor.  Initially it sent like she got a Holter but then she thinks she may have worn it longer than a couple of days.  We have called to get those results, will await to see if we get these.  We will then decide how best to proceed.  We will consider an echocardiogram and further monitoring predicated on the results of the results from [facility].    Thank you very much for allowing us to participate in the care of this pleasant patient.  Please don't hesitate to call if I can be of assistance in any way.      Current Outpatient Medications:   •  escitalopram (LEXAPRO) 10 MG tablet, Take 1 tablet by mouth Daily., Disp: 90 tablet, Rfl: 1  •  nystatin (MYCOSTATIN) [id number] UNIT/GM powder, Apply  topically to the appropriate area as directed 3 (Three) Times a Day., Disp: 30 g, Rfl: 0  •  zolpidem (Ambien) 5 MG tablet, Take 1 tablet by mouth At Night As Needed for Sleep (Bring to the sleep center, do not take at home)., Disp: 1 tablet, Rfl: 0         No follow-ups on file.

## 2023-07-25 DIAGNOSIS — G47.33 OBSTRUCTIVE SLEEP APNEA: Primary | ICD-10-CM

## 2023-07-26 ENCOUNTER — TELEPHONE (OUTPATIENT)
Dept: SLEEP MEDICINE | Facility: HOSPITAL | Age: 23
End: 2023-07-26
Payer: OTHER GOVERNMENT

## 2023-07-26 ENCOUNTER — TELEPHONE (OUTPATIENT)
Dept: CARDIOLOGY | Facility: CLINIC | Age: 23
End: 2023-07-26
Payer: OTHER GOVERNMENT

## 2023-07-26 RX ORDER — ATENOLOL 25 MG/1
25 TABLET ORAL NIGHTLY
Qty: 90 TABLET | Refills: 0 | Status: SHIPPED | OUTPATIENT
Start: 2023-07-26

## 2023-07-26 NOTE — TELEPHONE ENCOUNTER
----- Message from MARY Wright sent at 7/25/2023  4:56 PM EDT -----  I am covering Dr. Yoon's in basket today because he is out of the office.      Please call patient.  She was in normal rhythm and her average heart rate was 104 bpm.  This is sinus tachycardia.  If she is symptomatic and her blood pressure is stable, she could try atenolol 25 mg 1 tablet daily at nighttime.  She would then need a follow-up with me or Dr. Ross in 3 months.  Please let us know.  Thanks

## 2023-07-26 NOTE — TELEPHONE ENCOUNTER
Notified patient of results/recommendations. Patient verbalized understanding. She is symptomatic and BP is stable. She would like to try the atenolol. Prescription sent to her requested pharmacy and Fu scheduled.     Jenniffer Gayle RN  [place] [facility]

## 2023-10-18 ENCOUNTER — OFFICE VISIT (OUTPATIENT)
Dept: FAMILY MEDICINE CLINIC | Facility: CLINIC | Age: 23
End: 2023-10-18
Payer: OTHER GOVERNMENT

## 2023-10-18 VITALS
WEIGHT: 258 LBS | DIASTOLIC BLOOD PRESSURE: 70 MMHG | BODY MASS INDEX: 41.46 KG/M2 | HEART RATE: 94 BPM | TEMPERATURE: 98 F | HEIGHT: 66 IN | OXYGEN SATURATION: 98 % | SYSTOLIC BLOOD PRESSURE: 116 MMHG

## 2023-10-18 DIAGNOSIS — E66.01 CLASS 3 SEVERE OBESITY WITH SERIOUS COMORBIDITY AND BODY MASS INDEX (BMI) OF 40.0 TO 44.9 IN ADULT, UNSPECIFIED OBESITY TYPE: ICD-10-CM

## 2023-10-18 DIAGNOSIS — R00.2 PALPITATIONS: ICD-10-CM

## 2023-10-18 DIAGNOSIS — Z13.220 ENCOUNTER FOR LIPID SCREENING FOR CARDIOVASCULAR DISEASE: ICD-10-CM

## 2023-10-18 DIAGNOSIS — D22.9 CHANGE IN MOLE: ICD-10-CM

## 2023-10-18 DIAGNOSIS — L30.9 DERMATITIS: ICD-10-CM

## 2023-10-18 DIAGNOSIS — G47.30 SLEEP APNEA, UNSPECIFIED TYPE: ICD-10-CM

## 2023-10-18 DIAGNOSIS — M25.50 ARTHRALGIA OF MULTIPLE JOINTS: ICD-10-CM

## 2023-10-18 DIAGNOSIS — F33.2 SEVERE EPISODE OF RECURRENT MAJOR DEPRESSIVE DISORDER, WITHOUT PSYCHOTIC FEATURES: ICD-10-CM

## 2023-10-18 DIAGNOSIS — J30.9 ALLERGIC RHINITIS, UNSPECIFIED SEASONALITY, UNSPECIFIED TRIGGER: ICD-10-CM

## 2023-10-18 DIAGNOSIS — Z13.6 ENCOUNTER FOR LIPID SCREENING FOR CARDIOVASCULAR DISEASE: ICD-10-CM

## 2023-10-18 DIAGNOSIS — Z00.00 ENCOUNTER FOR ANNUAL PHYSICAL EXAM: Primary | ICD-10-CM

## 2023-10-18 DIAGNOSIS — Z11.59 NEED FOR HEPATITIS C SCREENING TEST: ICD-10-CM

## 2023-10-18 DIAGNOSIS — L30.4 INTERTRIGO: ICD-10-CM

## 2023-10-18 DIAGNOSIS — F33.1 MODERATE EPISODE OF RECURRENT MAJOR DEPRESSIVE DISORDER: ICD-10-CM

## 2023-10-18 DIAGNOSIS — R53.83 FATIGUE, UNSPECIFIED TYPE: ICD-10-CM

## 2023-10-18 DIAGNOSIS — F41.9 ANXIETY: ICD-10-CM

## 2023-10-18 PROBLEM — E66.813 CLASS 3 SEVERE OBESITY WITHOUT SERIOUS COMORBIDITY WITH BODY MASS INDEX (BMI) OF 40.0 TO 44.9 IN ADULT: Status: ACTIVE | Noted: 2023-10-18

## 2023-10-18 PROBLEM — R12 HEARTBURN: Status: RESOLVED | Noted: 2022-07-13 | Resolved: 2023-10-18

## 2023-10-18 PROBLEM — R10.10 PAIN OF UPPER ABDOMEN: Status: RESOLVED | Noted: 2022-07-13 | Resolved: 2023-10-18

## 2023-10-18 RX ORDER — DESONIDE 0.5 MG/G
1 CREAM TOPICAL 2 TIMES DAILY PRN
Qty: 15 G | Refills: 0 | Status: SHIPPED | OUTPATIENT
Start: 2023-10-18

## 2023-10-18 RX ORDER — ESCITALOPRAM OXALATE 10 MG/1
20 TABLET ORAL DAILY
Qty: 180 TABLET | Refills: 0 | Status: SHIPPED | OUTPATIENT
Start: 2023-10-18

## 2023-10-18 NOTE — PATIENT INSTRUCTIONS
Increase Escitalopram 10 mg to 2 tablets daily for total of 20 mg daily.  Continue to work on healthy diet and exercise.  Try over the counter antihistamine, such as Claritin or Allegra or nasal steroid, such as Flonase or Nasacort.  Follow up pending lab results.  Follow up in 3 months, or sooner if symptoms persist or worsen.   Schedule an appointment with GYN for PAP.

## 2023-10-18 NOTE — PROGRESS NOTES
Subjective   Clair Overton is a 23 y.o. female.     Chief Complaint   Patient presents with    Annual Exam     Blood work          History of Present Illness   Patient presents for CPE with non-fasting labs; somewhat healthy diet, stress eats at times and will not eat as healthy; some exercise, has been exercising more recently, has been walking regularly and some core workouts; no recent dental visits; no recent eye exam, no vision correction; some mild decrease in hearing; immunizations: declines COVID-19 booster and flu vaccine; no recent female care; last PAP 10/2020; mammo never performed; mother with family history of breast cancer, diagnosed around age 47 years; colonoscopy never performed; no family history of colon cancer.     F/U palpitations: saw cardiology and ordered updated Holter monitor; Holter monitor noted sinus tach in low 100s; has not started on Atenolol daily yet and just picked up Rx; still has palpitations about once per week, seems random.    F/U anxiety: takes Lexapro daily and works well; considering trying a little higher dose; has trouble falling asleep and staying asleep; has not been getting much sleep due recent stressors and trouble sleeping; some overeating most days; see PHQ-9 and HAO-7; no SI/HI.    F/U intertrigo: uses Nystatin powder as needed, but does not seem to be helping much; will have redness and burning sensation in skin fold under breasts.     F/U snoring: saw sleep medicine and diagnosed with sleep apnea; started CPAP nightly and has helped.         The following portions of the patient's history were reviewed and updated as appropriate: allergies, current medications, past family history, past medical history, past social history, past surgical history and problem list.    Current Outpatient Medications on File Prior to Visit   Medication Sig    atenolol (TENORMIN) 25 MG tablet Take 1 tablet by mouth Every Night.    nystatin (MYCOSTATIN) 604662 UNIT/GM powder Apply   topically to the appropriate area as directed 3 (Three) Times a Day.    [DISCONTINUED] escitalopram (LEXAPRO) 10 MG tablet Take 1 tablet by mouth Daily.    [DISCONTINUED] zolpidem (Ambien) 5 MG tablet Take 1 tablet by mouth At Night As Needed for Sleep (Bring to the sleep center, do not take at home). (Patient not taking: Reported on 10/18/2023)     No current facility-administered medications on file prior to visit.        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    Intertrigo     using nystatin    Shoulder joint pain 08/05/2013       Past Surgical History:   Procedure Laterality Date    CHOLECYSTECTOMY WITH INTRAOPERATIVE CHOLANGIOGRAM N/A 01/27/2023    Procedure: LAPAROSCOPIC CHOLECYSTECTOMY WITH INTRAOPERATIVE CHOLANGIOGRAM, POSSIBLE OPEN;  Surgeon: Mary Blanton MD;  Location: [facility] MAIN OR;  Service: General;  Laterality: N/A;    ENDOSCOPY N/A 09/21/2022    Procedure: ESOPHAGOGASTRODUODENOSCOPY WITH COLD BIOPSIES AND WITH BALLOON DILATATION 18MM-20MM;  Surgeon: Luis A Galindo MD;  [place] [facility] [place];  Service: Gastroenterology;  Laterality: N/A;  S. RING, ESOPHAGITIS       Family History   Problem Relation Age of Onset    Breast cancer Mother 47    Ulcerative colitis Father     Arthritis Father     Irritable bowel syndrome Maternal Grandmother     Diabetes Maternal Grandmother     Hypertension Maternal Grandmother     Thyroid disease Maternal Grandmother     Migraines Maternal Grandmother     Diverticulosis Maternal Grandmother     Heart failure Maternal Grandfather     Diabetes Maternal Grandfather     Osteoarthritis Maternal Grandfather     Asthma Maternal Grandfather     Hyperlipidemia Maternal Grandfather     Hypertension Maternal Grandfather     Colon cancer  Paternal Grandfather     Colon polyps Neg Hx     Crohn's disease Neg Hx     Malig Hyperthermia Neg Hx        Social History     Socioeconomic History    Marital status:    Tobacco Use    Smoking status: Never    Smokeless tobacco: Never   Vaping Use    Vaping Use: Every day    Substances: Nicotine   Substance and Sexual Activity    Alcohol use: No    Drug use: Never    Sexual activity: Not Currently       Review of Systems   Constitutional:  Positive for fatigue. Negative for appetite change, chills, fever, unexpected weight gain and unexpected weight loss.   HENT:  Positive for sinus pressure. Negative for ear pain, sore throat and trouble swallowing. Rhinorrhea: some at times.   Eyes:  Negative for blurred vision and discharge.   Respiratory:  Negative for cough and shortness of breath. Chest tightness: some at times with anxiety.   Cardiovascular:  Negative for chest pain and leg swelling. Palpitations: see HPI.  Gastrointestinal:  Negative for abdominal pain, blood in stool, GERD and indigestion (resolved with pamela). Constipation: some at times. Diarrhea: some at times.  Endocrine: Positive for cold intolerance. Negative for heat intolerance. Polydipsia: some at times, needs to drink more water.  Genitourinary:  Negative for dysuria and frequency.   Musculoskeletal:  Positive for arthralgias (knees, hips, shoulders, and all of back).   Skin:  Rash: see HPI. Skin lesions: has mole on chest.   Neurological:  Negative for syncope and headache. Dizziness: some at times with position changes.  Hematological:  Does not bruise/bleed easily.   Psychiatric/Behavioral:  Negative for suicidal ideas.        PHQ-9 Depression Screening  Little interest or pleasure in doing things? 1-->several days   Feeling down, depressed, or hopeless? 1-->several days   Trouble falling or staying asleep, or sleeping too much? 3-->nearly every day   Feeling tired or having little energy? 3-->nearly every day   Poor appetite or  "overeating? 3-->nearly every day   Feeling bad about yourself - or that you are a failure or have let yourself or your family down? 1-->several days   Trouble concentrating on things, such as reading the newspaper or watching television? 3-->nearly every day   Moving or speaking so slowly that other people could have noticed? Or the opposite - being so fidgety or restless that you have been moving around a lot more than usual? 3-->nearly every day   Thoughts that you would be better off dead, or of hurting yourself in some way? 0-->not at all   PHQ-9 Total Score 18   If you checked off any problems, how difficult have these problems made it for you to do your work, take care of things at home, or get along with other people? extremely difficult     HAO-7 score: 21    Objective   Vitals:    10/18/23 1519   BP: 116/70   BP Location: Left arm   Patient Position: Sitting   Cuff Size: Adult   Pulse: 94   Temp: 98 °F (36.7 °C)   SpO2: 98%   Weight: 117 kg (258 lb)   Height: 167.6 cm (66\")     Body mass index is 41.64 kg/m².    Physical Exam  Vitals and nursing note reviewed.   Constitutional:       General: She is not in acute distress.     Appearance: She is well-developed and well-groomed. She is not diaphoretic.   HENT:      Head: Normocephalic and atraumatic.      Jaw: No tenderness or pain on movement.      Right Ear: Tympanic membrane and external ear normal. No decreased hearing noted.      Left Ear: External ear normal. No decreased hearing noted. Left ear middle ear effusion: mild. Tympanic membrane is not erythematous.      Nose: Nose normal.      Right Sinus: No maxillary sinus tenderness or frontal sinus tenderness.      Left Sinus: No maxillary sinus tenderness or frontal sinus tenderness.      Mouth/Throat:      Mouth: Mucous membranes are moist.      Pharynx: Oropharyngeal exudate: cobblestoning present. Posterior oropharyngeal erythema: mild in posterior pharynx.   Eyes:      Extraocular Movements: " Extraocular movements intact.      Conjunctiva/sclera: Conjunctivae normal.      Pupils: Pupils are equal, round, and reactive to light.   Neck:      Thyroid: No thyromegaly.      Vascular: No carotid bruit.      Trachea: No tracheal deviation.   Cardiovascular:      Rate and Rhythm: Normal rate and regular rhythm.      Pulses: Normal pulses.      Heart sounds: Normal heart sounds. No murmur heard.  Pulmonary:      Effort: Pulmonary effort is normal. No respiratory distress.      Breath sounds: Normal breath sounds.   Abdominal:      General: Bowel sounds are normal.      Palpations: Abdomen is soft. There is no hepatomegaly or splenomegaly.      Tenderness: There is no abdominal tenderness. There is no guarding.   Musculoskeletal:         General: Normal range of motion.      Cervical back: Normal range of motion and neck supple. No bony tenderness.      Thoracic back: No bony tenderness.      Lumbar back: No bony tenderness.      Right lower leg: No edema.      Left lower leg: No edema.   Lymphadenopathy:      Cervical: No cervical adenopathy.   Skin:     General: Skin is warm and dry.      Findings: No rash.          Neurological:      Mental Status: She is alert and oriented to person, place, and time.      Cranial Nerves: No cranial nerve deficit.      Motor: Motor function is intact.      Coordination: Coordination normal.      Gait: Gait normal.      Deep Tendon Reflexes: Reflexes are normal and symmetric.   Psychiatric:         Mood and Affect: Mood normal.         Behavior: Behavior normal.         Thought Content: Thought content normal.         Cognition and Memory: Cognition normal.         Judgment: Judgment normal.         Lab Results   Component Value Date    WBC 6.8 03/14/2023    RBC 4.87 03/14/2023    HGB 12.6 03/14/2023    HCT 40.0 03/14/2023    MCV 82 03/14/2023    MCH 25.9 (L) 03/14/2023    MCHC 31.5 03/14/2023    RDW 13.2 03/14/2023     03/14/2023    NEUTRORELPCT 57 03/14/2023     LYMPHORELPCT 31 03/14/2023    MONORELPCT 8 03/14/2023    EOSRELPCT 3 03/14/2023    BASORELPCT 1 03/14/2023    NEUTROABS 3.8 03/14/2023    LYMPHSABS 2.1 03/14/2023    MONOSABS 0.5 03/14/2023    EOSABS 0.2 03/14/2023    BASOSABS 0.1 03/14/2023     Lab Results   Component Value Date    GLUCOSE 91 03/14/2023    BUN 12 03/14/2023    CREATININE 0.75 03/14/2023    BCR 16 03/14/2023    K 4.2 03/14/2023    CO2 26 03/14/2023    CALCIUM 9.0 03/14/2023    PROTENTOTREF 6.5 03/14/2023    ALBUMIN 4.0 03/14/2023    LABIL2 1.6 03/14/2023    AST 13 03/14/2023    ALT 10 03/14/2023      Lab Results   Component Value Date    CHLPL 171 05/11/2022    TRIG 90 05/11/2022    HDL 43 05/11/2022    VLDL 17 05/11/2022     (H) 05/11/2022     Lab Results   Component Value Date    TSH 0.809 03/14/2023     Lab Results   Component Value Date    HGBA1C 5.2 05/11/2022     Lab Results   Component Value Date    COLORU Yellow 09/13/2022    CLARITYU Clear 09/13/2022    LEUKOCYTESUR Large (3+) (A) 09/13/2022    BILIRUBINUR Negative 09/13/2022          Assessment    Problem List Items Addressed This Visit       Anxiety    Current Assessment & Plan     Improving with treatment.  Increase Escitalopram 10 mg to 2 tablets daily for total of 20 mg daily.         Relevant Medications    escitalopram (LEXAPRO) 10 MG tablet    Moderate episode of recurrent major depressive disorder    Current Assessment & Plan     Patient's depression is recurrent and is moderate without psychosis. Their depression is currently active and the condition is improving with treatment. This will be reassessed in 3 months. F/U as described:patient will continue current medication therapy.  Increase Escitalopram 10 mg to 2 tablets daily for total of 20 mg daily.         Relevant Medications    escitalopram (LEXAPRO) 10 MG tablet    Palpitations    Relevant Orders    Comprehensive Metabolic Panel    TSH Rfx On Abnormal To Free T4    Change in mole    Relevant Medications    desonide  (DesOwen) 0.05 % cream    Other Relevant Orders    Ambulatory Referral to Dermatology    Allergic rhinitis    Current Assessment & Plan     Try over the counter antihistamine, such as Claritin or Allegra or nasal steroid, such as Flonase or Nasacort.         Intertrigo    Relevant Medications    desonide (DesOwen) 0.05 % cream    Sleep apnea    Overview     Uses CPAP.         Fatigue    Relevant Orders    Comprehensive Metabolic Panel    TSH Rfx On Abnormal To Free T4    Vitamin B12 & Folate    Arthralgia of multiple joints    Current Assessment & Plan     Try over the counter Ibuprofen or Tylenol as needed.         Relevant Orders    NICOLE With / DsDNA, RNP, Sjogrens A / B, Cervantes    CBC & Differential    Rheumatoid Factor    Uric Acid    C-reactive Protein    Sedimentation Rate    Dermatitis    Relevant Medications    desonide (DesOwen) 0.05 % cream    Other Relevant Orders    Ambulatory Referral to Dermatology    Class 3 severe obesity with serious comorbidity and body mass index (BMI) of 40.0 to 44.9 in adult    Current Assessment & Plan     Patient's (Body mass index is 41.64 kg/m².) indicates that they are morbidly/severely obese (BMI > 40 or > 35 with obesity - related health condition) with health conditions that include obstructive sleep apnea and dyslipidemias . Weight is unchanged. BMI  is above average; BMI management plan is completed. We discussed low calorie, low carb based diet program, portion control, and increasing exercise.           Other Visit Diagnoses       Encounter for annual physical exam    -  Primary    Relevant Orders    Comprehensive Metabolic Panel    TSH Rfx On Abnormal To Free T4    Vitamin B12 & Folate    NICOLE With / DsDNA, RNP, Sjogrens A / B, Cervantes    CBC & Differential    Rheumatoid Factor    Uric Acid    C-reactive Protein    Sedimentation Rate    Lipid Panel With LDL / HDL Ratio    Encounter for lipid screening for cardiovascular disease        Relevant Orders    Lipid Panel With  LDL / HDL Ratio    Need for hepatitis C screening test        Relevant Orders    Hepatitis C Antibody             Return in about 3 months (around 1/18/2024) for Recheck, Video visit.or sooner if symptoms persist or worsen.  Impression: Health maintenance visit.  Currently, eats a somewhat healthy diet and has a fair exercise routine.  Cervical cancer screening: due for PAP.  Breast cancer screening: not indicated, discussed early screening due to family history.  Colorectal cancer screening: not indicated.    Screening lab work includes: CMP, lipid.  Immunizations: declines COVID-19 booster and flu vaccine; risks and benefits of immunizations were discussed with patient.  Patient was advised to be evaluated by dentist and ophthalmology.  Advice and education were given regarding nutrition and aerobic exercise.

## 2023-10-19 LAB
ALBUMIN SERPL-MCNC: 4.1 G/DL (ref 4–5)
ALBUMIN/GLOB SERPL: 1.7 {RATIO} (ref 1.2–2.2)
ALP SERPL-CCNC: 88 IU/L (ref 44–121)
ALT SERPL-CCNC: 12 IU/L (ref 0–32)
ANA SER QL: NEGATIVE
AST SERPL-CCNC: 16 IU/L (ref 0–40)
BASOPHILS # BLD AUTO: 0.1 X10E3/UL (ref 0–0.2)
BASOPHILS NFR BLD AUTO: 1 %
BILIRUB SERPL-MCNC: 0.4 MG/DL (ref 0–1.2)
BUN SERPL-MCNC: 12 MG/DL (ref 6–20)
BUN/CREAT SERPL: 16 (ref 9–23)
CALCIUM SERPL-MCNC: 9 MG/DL (ref 8.7–10.2)
CHLORIDE SERPL-SCNC: 104 MMOL/L (ref 96–106)
CHOLEST SERPL-MCNC: 171 MG/DL (ref 100–199)
CO2 SERPL-SCNC: 26 MMOL/L (ref 20–29)
CREAT SERPL-MCNC: 0.74 MG/DL (ref 0.57–1)
CRP SERPL-MCNC: 8 MG/L (ref 0–10)
EGFRCR SERPLBLD CKD-EPI 2021: 115 ML/MIN/1.73
EOSINOPHIL # BLD AUTO: 0.1 X10E3/UL (ref 0–0.4)
EOSINOPHIL NFR BLD AUTO: 2 %
ERYTHROCYTE [DISTWIDTH] IN BLOOD BY AUTOMATED COUNT: 12.4 % (ref 11.7–15.4)
ERYTHROCYTE [SEDIMENTATION RATE] IN BLOOD BY WESTERGREN METHOD: 19 MM/HR (ref 0–32)
FOLATE SERPL-MCNC: 9.5 NG/ML
GLOBULIN SER CALC-MCNC: 2.4 G/DL (ref 1.5–4.5)
GLUCOSE SERPL-MCNC: 87 MG/DL (ref 70–99)
HCT VFR BLD AUTO: 40.3 % (ref 34–46.6)
HCV IGG SERPL QL IA: NON REACTIVE
HDLC SERPL-MCNC: 39 MG/DL
HGB BLD-MCNC: 13.1 G/DL (ref 11.1–15.9)
IMM GRANULOCYTES # BLD AUTO: 0 X10E3/UL (ref 0–0.1)
IMM GRANULOCYTES NFR BLD AUTO: 0 %
LDLC SERPL CALC-MCNC: 103 MG/DL (ref 0–99)
LDLC/HDLC SERPL: 2.6 RATIO (ref 0–3.2)
LYMPHOCYTES # BLD AUTO: 1.8 X10E3/UL (ref 0.7–3.1)
LYMPHOCYTES NFR BLD AUTO: 25 %
MCH RBC QN AUTO: 27.9 PG (ref 26.6–33)
MCHC RBC AUTO-ENTMCNC: 32.5 G/DL (ref 31.5–35.7)
MCV RBC AUTO: 86 FL (ref 79–97)
MONOCYTES # BLD AUTO: 0.5 X10E3/UL (ref 0.1–0.9)
MONOCYTES NFR BLD AUTO: 7 %
NEUTROPHILS # BLD AUTO: 4.7 X10E3/UL (ref 1.4–7)
NEUTROPHILS NFR BLD AUTO: 65 %
PLATELET # BLD AUTO: 362 X10E3/UL (ref 150–450)
POTASSIUM SERPL-SCNC: 4 MMOL/L (ref 3.5–5.2)
PROT SERPL-MCNC: 6.5 G/DL (ref 6–8.5)
RBC # BLD AUTO: 4.7 X10E6/UL (ref 3.77–5.28)
RHEUMATOID FACT SERPL-ACNC: <10 IU/ML
SODIUM SERPL-SCNC: 141 MMOL/L (ref 134–144)
TRIGL SERPL-MCNC: 168 MG/DL (ref 0–149)
TSH SERPL DL<=0.005 MIU/L-ACNC: 1.51 UIU/ML (ref 0.45–4.5)
URATE SERPL-MCNC: 4.1 MG/DL (ref 2.6–6.2)
VIT B12 SERPL-MCNC: 748 PG/ML (ref 232–1245)
VLDLC SERPL CALC-MCNC: 29 MG/DL (ref 5–40)
WBC # BLD AUTO: 7.1 X10E3/UL (ref 3.4–10.8)

## 2023-10-19 NOTE — ASSESSMENT & PLAN NOTE
Patient's (Body mass index is 41.64 kg/m².) indicates that they are morbidly/severely obese (BMI > 40 or > 35 with obesity - related health condition) with health conditions that include obstructive sleep apnea and dyslipidemias . Weight is unchanged. BMI  is above average; BMI management plan is completed. We discussed low calorie, low carb based diet program, portion control, and increasing exercise.

## 2023-10-19 NOTE — ASSESSMENT & PLAN NOTE
Patient's depression is recurrent and is moderate without psychosis. Their depression is currently active and the condition is improving with treatment. This will be reassessed in 3 months. F/U as described:patient will continue current medication therapy.  Increase Escitalopram 10 mg to 2 tablets daily for total of 20 mg daily.

## 2023-10-19 NOTE — ASSESSMENT & PLAN NOTE
Try over the counter antihistamine, such as Claritin or Allegra or nasal steroid, such as Flonase or Nasacort.

## 2023-10-20 ENCOUNTER — OFFICE VISIT (OUTPATIENT)
Dept: SLEEP MEDICINE | Facility: HOSPITAL | Age: 23
End: 2023-10-20
Payer: OTHER GOVERNMENT

## 2023-10-20 ENCOUNTER — PATIENT ROUNDING (BHMG ONLY) (OUTPATIENT)
Dept: FAMILY MEDICINE CLINIC | Facility: CLINIC | Age: 23
End: 2023-10-20
Payer: OTHER GOVERNMENT

## 2023-10-20 VITALS
SYSTOLIC BLOOD PRESSURE: 131 MMHG | DIASTOLIC BLOOD PRESSURE: 72 MMHG | BODY MASS INDEX: 41.43 KG/M2 | WEIGHT: 257.8 LBS | HEART RATE: 75 BPM | OXYGEN SATURATION: 98 % | HEIGHT: 66 IN

## 2023-10-20 DIAGNOSIS — R09.81 NASAL CONGESTION: ICD-10-CM

## 2023-10-20 DIAGNOSIS — Z78.9 DIFFICULTY WITH CPAP USE: ICD-10-CM

## 2023-10-20 DIAGNOSIS — G47.33 OBSTRUCTIVE SLEEP APNEA: Primary | ICD-10-CM

## 2023-10-20 PROCEDURE — G0463 HOSPITAL OUTPT CLINIC VISIT: HCPCS

## 2023-10-20 NOTE — PROGRESS NOTES
"[facility]  Telephone: [phone] / Fax: [phone] [place]  Telephone: [phone] / Fax: [phone] Giulia Dodson    PCP: Matilda Hamlin, MARY    Reason for visit: MARYL f/u    Clair Overton is a 23 y.o.female  was last seen at [facility] sleep lab in May 2023. She had a sleep study in July 2023 showing mild-moderate MARLY. We ordered auto CPAP 5-20cm H2O. She got it in September 2023.  At first Clair was intolerant due to excessive pressures, then bouts of congestion. Some nights she is unable to sleep at all. Kids wake up early and climb in the bed with her. She got nasal mask initially as she has anxiety with menchaca mask.  However, now she is willing to try a full face mask as she suffers from chronic congestion.    SH- E-cigs, no alcohol, 1 energy drinks.    ROS-+nasal congestion, +post nasal drip.    DME [employer]    Current Medications:    Current Outpatient Medications:     atenolol (TENORMIN) 25 MG tablet, Take 1 tablet by mouth Every Night., Disp: 90 tablet, Rfl: 0    desonide (DesOwen) 0.05 % cream, Apply 1 application  topically to the appropriate area as directed 2 (Two) Times a Day As Needed for Irritation., Disp: 15 g, Rfl: 0    escitalopram (LEXAPRO) 10 MG tablet, Take 2 tablets by mouth Daily., Disp: 180 tablet, Rfl: 0    nystatin (MYCOSTATIN) 115265 UNIT/GM powder, Apply  topically to the appropriate area as directed 3 (Three) Times a Day., Disp: 30 g, Rfl: 0   also entered in Sleep Questionnaire    Patient  has a past medical history of Anxiety, Back pain, Calculus of gallbladder with chronic cholecystitis without obstruction (01/12/2023), Gestational diabetes mellitus in pregnancy, Heart palpitations, Instability of right shoulder joint (08/23/2013), Intertrigo, and Shoulder joint pain (08/05/2013).    I have reviewed the Past Medical History, Past Surgical History, Social History and Family History.    Vital Signs /72   Pulse 75   Ht 167.6 cm (66\")   Wt 117 kg " (257 lb 12.8 oz)   SpO2 98%   BMI 41.61 kg/m²  Body mass index is 41.61 kg/m².    General Alert and oriented. No acute distress noted   Pharynx/Throat Class IV  Mallampati airway, large tongue, no evidence of redundant lateral pharyngeal tissue. No oral lesions. No thrush. Moist mucous membranes.   Head Normocephalic. Symmetrical. Atraumatic.    Nose No septal deviation. No drainage   Chest Wall Normal shape. Symmetric expansion with respiration. No tenderness.   Neck Trachea midline, no thyromegaly or adenopathy    Lungs Clear to auscultation bilaterally. No wheezes. No rhonchi. No rales. Respirations regular, even and unlabored.   Heart Regular rhythm and normal rate. Normal S1 and S2. No murmur   Abdomen Soft, non-tender and non-distended. Normal bowel sounds. No masses.   Extremities Moves all extremities well. No edema   Psychiatric Normal mood and affect.     Testing:  Download Sept 2-Oct 17, over 4 hr usage noted on 4% of the nights, avg nightly use is 2 hours and 37 minutes, AHI 1.5/hr, leak 26 L.min.    Study-Results:  Overnight polysomnogram study from 10:17 PM to 4:47 AM.  Sleep efficiency extremely poor at 29%.  Sleep distribution abnormal with absence of REM sleep.  AHI index 10.5.  In supine sleep AHI index 30.4.  Nonsupine index 6.77.  No significant snoring noted.  Arousal index 17.3.  PLM index 18.9.  Oxygen saturation fell below 89% for 15.9 minutes.    Impression:  1. Obstructive sleep apnea    2. Difficulty with CPAP use    3. Nasal congestion          Plan:  I asked [facility] sleep to see patient for mask fitting. I explained to patient that insurance may not cover CPAP with lack of appropriate compliance. I advised her to try low dose Melatonin if needed if she is unable to fall asleep. I reviewed original sleep study and recent download report with patient. She was asked to increase compliance. Re-evaluate here in 3 months. She may need in lab titration study. We discussed OTC strategies for nasal  congestion.      Weight loss will be strongly beneficial to reduce the severity of sleep-disordered breathing.  Caution during activities that require prolonged concentration is strongly advised if sleepiness returns.         Thank you for allowing me to participate in your patient's care.      MARY Pierce  [facility]  Phone: [phone]      Part of this note may be an electronic transcription/translation of spoken language to printed text using the Dragon Dictation System.

## 2023-10-20 NOTE — PROGRESS NOTES
A My-Chart message has been sent to the patient for PATIENT ROUNDING with [facility] [place]

## 2024-01-11 RX ORDER — ATENOLOL 25 MG/1
25 TABLET ORAL NIGHTLY
Qty: 90 TABLET | Refills: 0 | Status: SHIPPED | OUTPATIENT
Start: 2024-01-11

## 2024-02-17 DIAGNOSIS — F33.2 SEVERE EPISODE OF RECURRENT MAJOR DEPRESSIVE DISORDER, WITHOUT PSYCHOTIC FEATURES: ICD-10-CM

## 2024-02-17 DIAGNOSIS — F41.9 ANXIETY: ICD-10-CM

## 2024-02-19 RX ORDER — ESCITALOPRAM OXALATE 10 MG/1
10 TABLET ORAL DAILY
Qty: 30 TABLET | Refills: 0 | Status: SHIPPED | OUTPATIENT
Start: 2024-02-19

## 2024-03-20 DIAGNOSIS — F33.2 SEVERE EPISODE OF RECURRENT MAJOR DEPRESSIVE DISORDER, WITHOUT PSYCHOTIC FEATURES: ICD-10-CM

## 2024-03-20 DIAGNOSIS — F41.9 ANXIETY: ICD-10-CM

## 2024-03-20 RX ORDER — ESCITALOPRAM OXALATE 10 MG/1
10 TABLET ORAL DAILY
Qty: 30 TABLET | Refills: 0 | Status: SHIPPED | OUTPATIENT
Start: 2024-03-20

## 2024-04-12 ENCOUNTER — OFFICE VISIT (OUTPATIENT)
Dept: FAMILY MEDICINE CLINIC | Facility: CLINIC | Age: 24
End: 2024-04-12
Payer: OTHER GOVERNMENT

## 2024-04-12 VITALS
SYSTOLIC BLOOD PRESSURE: 110 MMHG | HEIGHT: 66 IN | WEIGHT: 279 LBS | TEMPERATURE: 97 F | OXYGEN SATURATION: 98 % | BODY MASS INDEX: 44.84 KG/M2 | HEART RATE: 96 BPM | DIASTOLIC BLOOD PRESSURE: 80 MMHG

## 2024-04-12 DIAGNOSIS — G47.30 SLEEP APNEA, UNSPECIFIED TYPE: ICD-10-CM

## 2024-04-12 DIAGNOSIS — R63.5 UNEXPLAINED WEIGHT GAIN: ICD-10-CM

## 2024-04-12 DIAGNOSIS — K59.00 CONSTIPATION, UNSPECIFIED CONSTIPATION TYPE: ICD-10-CM

## 2024-04-12 DIAGNOSIS — J01.90 ACUTE NON-RECURRENT SINUSITIS, UNSPECIFIED LOCATION: ICD-10-CM

## 2024-04-12 DIAGNOSIS — L29.9 ITCHING: ICD-10-CM

## 2024-04-12 DIAGNOSIS — F41.9 ANXIETY: ICD-10-CM

## 2024-04-12 DIAGNOSIS — L30.4 INTERTRIGO: ICD-10-CM

## 2024-04-12 DIAGNOSIS — R00.2 PALPITATIONS: ICD-10-CM

## 2024-04-12 DIAGNOSIS — F33.1 MODERATE EPISODE OF RECURRENT MAJOR DEPRESSIVE DISORDER: ICD-10-CM

## 2024-04-12 DIAGNOSIS — R51.9 NONINTRACTABLE EPISODIC HEADACHE, UNSPECIFIED HEADACHE TYPE: Primary | ICD-10-CM

## 2024-04-12 PROCEDURE — 99214 OFFICE O/P EST MOD 30 MIN: CPT | Performed by: NURSE PRACTITIONER

## 2024-04-12 RX ORDER — ESCITALOPRAM OXALATE 20 MG/1
20 TABLET ORAL DAILY
Qty: 90 TABLET | Refills: 1 | Status: SHIPPED | OUTPATIENT
Start: 2024-04-12

## 2024-04-12 RX ORDER — DESONIDE 0.5 MG/G
1 CREAM TOPICAL 2 TIMES DAILY PRN
Qty: 15 G | Refills: 0 | Status: SHIPPED | OUTPATIENT
Start: 2024-04-12

## 2024-04-12 RX ORDER — CETIRIZINE HYDROCHLORIDE 10 MG/1
10 TABLET ORAL DAILY
COMMUNITY

## 2024-04-12 RX ORDER — AMOXICILLIN AND CLAVULANATE POTASSIUM 875; 125 MG/1; MG/1
1 TABLET, FILM COATED ORAL 2 TIMES DAILY
Qty: 14 TABLET | Refills: 0 | Status: SHIPPED | OUTPATIENT
Start: 2024-04-12 | End: 2024-04-19

## 2024-04-12 RX ORDER — SUMATRIPTAN 50 MG/1
TABLET, FILM COATED ORAL
Qty: 12 TABLET | Refills: 1 | Status: SHIPPED | OUTPATIENT
Start: 2024-04-12

## 2024-04-12 NOTE — PROGRESS NOTES
Subjective   Clair Overton is a 23 y.o. female.     Chief Complaint   Patient presents with    Headache     3-4 times a week nausea dizzy always on left side ongoing for 2 months          History of Present Illness   Patient presents with c/o headaches; will get headache with dizziness 3-4 times per week; symptoms started about 2 months ago; headache is always on the left side; no change in vision, but some blurry vision; some nausea with headaches, but no vomiting; will have light sensitivity with headaches; drinks plenty of liquids; no recent eye exam, no vision correction; not much work on computer; has chronic stuffy nose and postnasal drainage; no ear pain or sore throat; no fever; uses CPAP nightly; no problems with consistent headaches in past; has tried Tylenol and Excedrin, but have not helped much.    Also, c/o itching; has been taking Zyrtec daily and helps; symptoms will return if does not take daily; not sure if related to stress; feels like has some rash but related to scratching due to itching; has been itchy all over; no new medications, lotions, detergents, etc; no SOA.    F/U palpitations: takes Atenolol daily and has helped; no symptoms as long as takes medication daily; needs to reschedule follow up with cardiology.    F/U anxiety: takes Lexapro daily; increased dose LOV and has really helped; overall sleep has improved, occasional trouble falling asleep, no problems staying asleep; no SI/HI.    F/U dermatitis/intertrigo: has not seen dermatology at this point; never received Rx for Desonide; Nystatin powder did not help.      The following portions of the patient's history were reviewed and updated as appropriate: allergies, current medications, past family history, past medical history, past social history, past surgical history and problem list.    Current Outpatient Medications on File Prior to Visit   Medication Sig    atenolol (TENORMIN) 25 MG tablet Take 1 tablet by mouth Every Night.     cetirizine (zyrTEC) 10 MG tablet Take 1 tablet by mouth Daily.    [DISCONTINUED] escitalopram (LEXAPRO) 10 MG tablet TAKE 1 TABLET BY MOUTH DAILY (Patient taking differently: Take 2 tablets by mouth Daily.)     No current facility-administered medications on file prior to visit.        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    Intertrigo     using nystatin    Shoulder joint pain 08/05/2013       Past Surgical History:   Procedure Laterality Date    CHOLECYSTECTOMY WITH INTRAOPERATIVE CHOLANGIOGRAM N/A 01/27/2023    Procedure: LAPAROSCOPIC CHOLECYSTECTOMY WITH INTRAOPERATIVE CHOLANGIOGRAM, POSSIBLE OPEN;  Surgeon: Mary Blanton MD;  Location: [facility] MAIN OR;  Service: General;  Laterality: N/A;    ENDOSCOPY N/A 09/21/2022    Procedure: ESOPHAGOGASTRODUODENOSCOPY WITH COLD BIOPSIES AND WITH BALLOON DILATATION 18MM-20MM;  Surgeon: Luis A Galindo MD;  Location: [facility] MAIN OR;  Service: Gastroenterology;  Laterality: N/A;  S. RING, ESOPHAGITIS       Family History   Problem Relation Age of Onset    Breast cancer Mother 47    Ulcerative colitis Father     Arthritis Father     Irritable bowel syndrome Maternal Grandmother     Diabetes Maternal Grandmother     Hypertension Maternal Grandmother     Thyroid disease Maternal Grandmother     Migraines Maternal Grandmother     Diverticulosis Maternal Grandmother     Heart failure Maternal Grandfather     Diabetes Maternal Grandfather     Osteoarthritis Maternal Grandfather     Asthma Maternal Grandfather     Hyperlipidemia Maternal Grandfather     Hypertension Maternal Grandfather     Colon cancer Paternal Grandfather     Colon polyps Neg Hx     Crohn's disease Neg Hx     Malig Hyperthermia Neg Hx        Social History     Socioeconomic  "History    Marital status:    Tobacco Use    Smoking status: Never    Smokeless tobacco: Never   Vaping Use    Vaping status: Every Day    Substances: Nicotine   Substance and Sexual Activity    Alcohol use: No    Drug use: Never    Sexual activity: Not Currently       Review of Systems   Constitutional:  Positive for fatigue (has had decreased energy last couple of weeks). Negative for appetite change, chills, fever and unexpected weight loss. Weight gain: has been eating less and working out more and weight has increased.  HENT:  Negative for trouble swallowing (some at times with dry food, seems random). Sinus pressure: some chronic sinus symptoms, has deviated septum.   Respiratory:  Negative for cough, chest tightness and shortness of breath.    Cardiovascular:  Negative for chest pain and leg swelling. Palpitations: see HPI.  Gastrointestinal:  Positive for constipation (no OTC treatment; BM is soft and hard to pass). Negative for abdominal pain, blood in stool (some dark BM last couple of weeks), diarrhea and GERD. Indigestion: rare heartburn, has improved since pamela.  Endocrine: Positive for cold intolerance (some at times). Negative for heat intolerance and polydipsia.   Genitourinary:  Negative for dysuria and frequency.   Neurological:  Dizziness: some with position changes, not related to headaches. Headache: see HPI.   Hematological:  Does not bruise/bleed easily.       Objective   Vitals:    04/12/24 1248 04/12/24 1356   BP: 110/80    BP Location: Left arm    Patient Position: Sitting    Cuff Size: Adult    Pulse: 105 96   Temp: 97 °F (36.1 °C)    SpO2: 98%    Weight: 127 kg (279 lb)    Height: 167.6 cm (66\")      Body mass index is 45.03 kg/m².    Physical Exam  Vitals and nursing note reviewed.   Constitutional:       General: She is not in acute distress.     Appearance: She is well-developed and well-groomed. She is not diaphoretic.   HENT:      Head: Normocephalic.      Jaw: No tenderness " or pain on movement.      Right Ear: External ear normal. No decreased hearing noted. Right ear middle ear effusion: mild. Tympanic membrane is not erythematous.      Left Ear: External ear normal. No decreased hearing noted. Left ear middle ear effusion: mild. Tympanic membrane is not erythematous.      Nose: Nose normal.      Right Sinus: No maxillary sinus tenderness or frontal sinus tenderness.      Left Sinus: No maxillary sinus tenderness or frontal sinus tenderness.      Mouth/Throat:      Mouth: Mucous membranes are moist.      Pharynx: No oropharyngeal exudate (cobblestoning present) or posterior oropharyngeal erythema.   Eyes:      Extraocular Movements: Extraocular movements intact.      Conjunctiva/sclera: Conjunctivae normal.      Pupils: Pupils are equal, round, and reactive to light.   Neck:      Vascular: No carotid bruit.   Cardiovascular:      Rate and Rhythm: Normal rate and regular rhythm.      Pulses: Normal pulses.      Heart sounds: Normal heart sounds. No murmur heard.  Pulmonary:      Effort: Pulmonary effort is normal. No respiratory distress.      Breath sounds: Normal breath sounds.   Abdominal:      General: Bowel sounds are normal.      Palpations: Abdomen is soft. There is no hepatomegaly or splenomegaly.      Tenderness: There is no abdominal tenderness. There is no guarding.   Musculoskeletal:      Cervical back: Normal range of motion and neck supple. No bony tenderness.      Thoracic back: No bony tenderness.      Lumbar back: No bony tenderness.      Right lower leg: No edema.      Left lower leg: No edema.   Lymphadenopathy:      Cervical: No cervical adenopathy.   Skin:     General: Skin is warm and dry.      Findings: No rash.   Neurological:      Mental Status: She is alert and oriented to person, place, and time.      Gait: Gait normal.   Psychiatric:         Mood and Affect: Mood normal.         Behavior: Behavior normal.         Thought Content: Thought content normal.          Cognition and Memory: Cognition normal.         Judgment: Judgment normal.         Lab Results   Component Value Date    WBC 7.1 10/18/2023    RBC 4.70 10/18/2023    HGB 13.1 10/18/2023    HCT 40.3 10/18/2023    MCV 86 10/18/2023    MCH 27.9 10/18/2023    MCHC 32.5 10/18/2023    RDW 12.4 10/18/2023     10/18/2023    NEUTRORELPCT 65 10/18/2023    LYMPHORELPCT 25 10/18/2023    MONORELPCT 7 10/18/2023    EOSRELPCT 2 10/18/2023    BASORELPCT 1 10/18/2023    NEUTROABS 4.7 10/18/2023    LYMPHSABS 1.8 10/18/2023    MONOSABS 0.5 10/18/2023    EOSABS 0.1 10/18/2023    BASOSABS 0.1 10/18/2023     Lab Results   Component Value Date    GLUCOSE 87 10/18/2023    BUN 12 10/18/2023    CREATININE 0.74 10/18/2023    BCR 16 10/18/2023    K 4.0 10/18/2023    CO2 26 10/18/2023    CALCIUM 9.0 10/18/2023    PROTENTOTREF 6.5 10/18/2023    ALBUMIN 4.1 10/18/2023    LABIL2 1.7 10/18/2023    AST 16 10/18/2023    ALT 12 10/18/2023      Lab Results   Component Value Date    CHLPL 171 10/18/2023    TRIG 168 (H) 10/18/2023    HDL 39 (L) 10/18/2023    VLDL 29 10/18/2023     (H) 10/18/2023     Lab Results   Component Value Date    TSH 1.510 10/18/2023     Lab Results   Component Value Date    HGBA1C 5.2 05/11/2022       Assessment    Problem List Items Addressed This Visit       Anxiety    Current Assessment & Plan     Stable.  Continue Escitalopram daily.         Relevant Medications    escitalopram (Lexapro) 20 MG tablet    Moderate episode of recurrent major depressive disorder    Current Assessment & Plan     Patient's depression is a recurrent episode that is moderate without psychosis. Depression is active and stable.    Plan:   Continue current medication therapy   Continue Escitalopram daily.         Relevant Medications    escitalopram (Lexapro) 20 MG tablet    Palpitations    Current Assessment & Plan     Stable.  Continue Atenolol daily.  Reschedule follow up appointment with cardiology.         Constipation    Current  Assessment & Plan     Continue increased intake of clear liquids.  Try over the counter fiber supplement or Benefiber.         Acute non-recurrent sinusitis    Relevant Medications    amoxicillin-clavulanate (AUGMENTIN) 875-125 MG per tablet    Intertrigo    Relevant Medications    desonide (DesOwen) 0.05 % cream    Nonintractable episodic headache - Primary    Current Assessment & Plan     Keep headache diary.  Continue increased intake of clear liquids.         Relevant Medications    SUMAtriptan (Imitrex) 50 MG tablet    Other Relevant Orders    Comprehensive Metabolic Panel    CBC & Differential    Vitamin B12 & Folate    Sleep apnea    Overview     Uses CPAP nightly.         Itching    Current Assessment & Plan     Continue Zyrtec daily.         Relevant Orders    Comprehensive Metabolic Panel    Unexplained weight gain    Relevant Orders    Comprehensive Metabolic Panel    TSH Rfx On Abnormal To Free T4         Return in about 1 month (around 5/12/2024) for Recheck.or sooner if symptoms persist or worsen.  Will try Augmentin for sinusitis and see if helps headaches; will also have patient keep headache diary and check labs for further evaluation; Rx for Sumatriptan given as well; discussed possible SE with Sumatriptan.  Patient has had itching all over; pt previously had some trouble with itching during times of anxiety, but has been more consistent; takes Zyrtec daily does help itching; will check labs today for further evaluation.

## 2024-04-12 NOTE — PATIENT INSTRUCTIONS
Keep headache diary.  Continue increased intake of clear liquids.  Try over the counter fiber supplement or Benefiber.  Continue Zyrtec daily.  Follow up pending lab results.  Follow up in 1 month, or sooner if symptoms persist or worsen.

## 2024-04-13 PROBLEM — R06.81 WITNESSED EPISODE OF APNEA: Status: RESOLVED | Noted: 2023-03-15 | Resolved: 2024-04-13

## 2024-04-13 PROBLEM — L29.9 ITCHING: Status: ACTIVE | Noted: 2024-04-13

## 2024-04-13 PROBLEM — R63.5 UNEXPLAINED WEIGHT GAIN: Status: ACTIVE | Noted: 2024-04-13

## 2024-04-13 LAB
ALBUMIN SERPL-MCNC: 4.1 G/DL (ref 4–5)
ALBUMIN/GLOB SERPL: 1.6 {RATIO} (ref 1.2–2.2)
ALP SERPL-CCNC: 90 IU/L (ref 44–121)
ALT SERPL-CCNC: 20 IU/L (ref 0–32)
AST SERPL-CCNC: 17 IU/L (ref 0–40)
BASOPHILS # BLD AUTO: 0.1 X10E3/UL (ref 0–0.2)
BASOPHILS NFR BLD AUTO: 1 %
BILIRUB SERPL-MCNC: 0.4 MG/DL (ref 0–1.2)
BUN SERPL-MCNC: 11 MG/DL (ref 6–20)
BUN/CREAT SERPL: 14 (ref 9–23)
CALCIUM SERPL-MCNC: 9.2 MG/DL (ref 8.7–10.2)
CHLORIDE SERPL-SCNC: 103 MMOL/L (ref 96–106)
CO2 SERPL-SCNC: 24 MMOL/L (ref 20–29)
CREAT SERPL-MCNC: 0.76 MG/DL (ref 0.57–1)
EGFRCR SERPLBLD CKD-EPI 2021: 111 ML/MIN/1.73
EOSINOPHIL # BLD AUTO: 0.2 X10E3/UL (ref 0–0.4)
EOSINOPHIL NFR BLD AUTO: 3 %
ERYTHROCYTE [DISTWIDTH] IN BLOOD BY AUTOMATED COUNT: 12.9 % (ref 11.7–15.4)
FOLATE SERPL-MCNC: 18 NG/ML
GLOBULIN SER CALC-MCNC: 2.6 G/DL (ref 1.5–4.5)
GLUCOSE SERPL-MCNC: 105 MG/DL (ref 70–99)
HCT VFR BLD AUTO: 41.8 % (ref 34–46.6)
HGB BLD-MCNC: 13.8 G/DL (ref 11.1–15.9)
IMM GRANULOCYTES # BLD AUTO: 0 X10E3/UL (ref 0–0.1)
IMM GRANULOCYTES NFR BLD AUTO: 0 %
LYMPHOCYTES # BLD AUTO: 1.9 X10E3/UL (ref 0.7–3.1)
LYMPHOCYTES NFR BLD AUTO: 28 %
MCH RBC QN AUTO: 27.9 PG (ref 26.6–33)
MCHC RBC AUTO-ENTMCNC: 33 G/DL (ref 31.5–35.7)
MCV RBC AUTO: 84 FL (ref 79–97)
MONOCYTES # BLD AUTO: 0.5 X10E3/UL (ref 0.1–0.9)
MONOCYTES NFR BLD AUTO: 7 %
NEUTROPHILS # BLD AUTO: 4.1 X10E3/UL (ref 1.4–7)
NEUTROPHILS NFR BLD AUTO: 61 %
PLATELET # BLD AUTO: 349 X10E3/UL (ref 150–450)
POTASSIUM SERPL-SCNC: 4 MMOL/L (ref 3.5–5.2)
PROT SERPL-MCNC: 6.7 G/DL (ref 6–8.5)
RBC # BLD AUTO: 4.95 X10E6/UL (ref 3.77–5.28)
SODIUM SERPL-SCNC: 140 MMOL/L (ref 134–144)
TSH SERPL DL<=0.005 MIU/L-ACNC: 2.33 UIU/ML (ref 0.45–4.5)
VIT B12 SERPL-MCNC: 616 PG/ML (ref 232–1245)
WBC # BLD AUTO: 6.7 X10E3/UL (ref 3.4–10.8)

## 2024-04-13 NOTE — ASSESSMENT & PLAN NOTE
Patient's depression is a recurrent episode that is moderate without psychosis. Depression is active and stable.    Plan:   Continue current medication therapy   Continue Escitalopram daily.

## 2024-05-20 RX ORDER — ATENOLOL 25 MG/1
25 TABLET ORAL NIGHTLY
Qty: 90 TABLET | Refills: 0 | Status: SHIPPED | OUTPATIENT
Start: 2024-05-20

## 2024-08-13 ENCOUNTER — OFFICE VISIT (OUTPATIENT)
Dept: FAMILY MEDICINE CLINIC | Facility: CLINIC | Age: 24
End: 2024-08-13
Payer: OTHER GOVERNMENT

## 2024-08-13 VITALS
DIASTOLIC BLOOD PRESSURE: 72 MMHG | TEMPERATURE: 97 F | BODY MASS INDEX: 45.8 KG/M2 | OXYGEN SATURATION: 98 % | WEIGHT: 285 LBS | HEIGHT: 66 IN | HEART RATE: 82 BPM | SYSTOLIC BLOOD PRESSURE: 116 MMHG

## 2024-08-13 DIAGNOSIS — G47.00 INSOMNIA, UNSPECIFIED TYPE: ICD-10-CM

## 2024-08-13 DIAGNOSIS — G43.909 MIGRAINE WITHOUT STATUS MIGRAINOSUS, NOT INTRACTABLE, UNSPECIFIED MIGRAINE TYPE: Primary | ICD-10-CM

## 2024-08-13 DIAGNOSIS — M54.42 CHRONIC BILATERAL LOW BACK PAIN WITH LEFT-SIDED SCIATICA: ICD-10-CM

## 2024-08-13 DIAGNOSIS — L29.9 ITCHING: ICD-10-CM

## 2024-08-13 DIAGNOSIS — R00.2 PALPITATIONS: ICD-10-CM

## 2024-08-13 DIAGNOSIS — J30.9 ALLERGIC RHINITIS, UNSPECIFIED SEASONALITY, UNSPECIFIED TRIGGER: ICD-10-CM

## 2024-08-13 DIAGNOSIS — F33.1 MODERATE EPISODE OF RECURRENT MAJOR DEPRESSIVE DISORDER: ICD-10-CM

## 2024-08-13 DIAGNOSIS — G89.29 CHRONIC BILATERAL LOW BACK PAIN WITH LEFT-SIDED SCIATICA: ICD-10-CM

## 2024-08-13 DIAGNOSIS — L30.9 DERMATITIS: ICD-10-CM

## 2024-08-13 DIAGNOSIS — F41.9 ANXIETY: ICD-10-CM

## 2024-08-13 DIAGNOSIS — G47.30 SLEEP APNEA, UNSPECIFIED TYPE: ICD-10-CM

## 2024-08-13 PROCEDURE — 99214 OFFICE O/P EST MOD 30 MIN: CPT | Performed by: NURSE PRACTITIONER

## 2024-08-13 RX ORDER — HYDROXYZINE HYDROCHLORIDE 25 MG/1
25 TABLET, FILM COATED ORAL NIGHTLY PRN
Qty: 30 TABLET | Refills: 0 | Status: SHIPPED | OUTPATIENT
Start: 2024-08-13

## 2024-08-13 NOTE — PROGRESS NOTES
Subjective   Clair Overton is a 24 y.o. female.     Chief Complaint   Patient presents with    Back Pain     When had migraine        History of Present Illness   Patient presents with c/o back pain when had severe migraine; no current back pain; was seen in ER and had migraine cocktail and helped; had been out in heat when headache started on 7/27/24; tried Tylenol and did not help; also had son's Bday after daughter's sporting event and had attributed to stress; then headache worse, tried Excedrin in late afternoon and did not help; then started with back pain and hurt to walk; pain was in neck and upper back between shoulders; could not turn head or move neck without discomfort; pain would shoot down spine with turning neck; tried shower because could not sleep due to worst pain she ever had; then woke up at 0530 in morning and got another shower; had nausea with headache; tried Sumatriptan for first time and did not help; then had vomiting 1 hour later; went to ER morning of 7/28/24; had fever in ER, but just felt like burning behind her eyes; migraine cocktail helped mostly; negative for COVID-19 virus/flu/RSV and strep; had CT head and WNL; were considering spinal tap, but did not since migraine cocktail helped and CT WNL; no change in vision with headache, just hurt to look at things and had light sensitivity; was getting headache 1-2 times per week prior to this headache; no headache or migraine since went to ER; has been trying to avoid caffeine and eat healthier; seems to get headache when out in heat, and typically will resolve with drinking liquids and taking Tylenol.    F/U palpitations: takes Atenolol daily and works well; will have symptoms if misses a dose; HR has been stable; needs to reschedule follow up with cardiology.    F/U anxiety/depression: takes Escitalopram daily and works well; has trouble falling asleep and staying asleep; has tried Melatonin, but did not like how felt with  medication.    F/U dermatitis: did not hear back from dermatology; has been using Desonide and has not helped much; had previously tried Nystatin and did not help.    F/U itching: takes Zyrtec daily and helped at first, but then does not seem to be working as well and will seem to wear off near time for next dose; has had some rash in area of tattoos; has had tattoos for long time.    Uses CPAP nightly some nights; had stopped using for period of time due to trouble with sinuses; waiting on new supplies for CPAP due to concern previous contributing to sinuses.      The following portions of the patient's history were reviewed and updated as appropriate: allergies, current medications, past family history, past medical history, past social history, past surgical history and problem list.    Current Outpatient Medications on File Prior to Visit   Medication Sig    atenolol (TENORMIN) 25 MG tablet TAKE 1 TABLET BY MOUTH EVERY NIGHT    cetirizine (zyrTEC) 10 MG tablet Take 1 tablet by mouth Daily.    desonide (DesOwen) 0.05 % cream Apply 1 Application topically to the appropriate area as directed 2 (Two) Times a Day As Needed for Irritation.    escitalopram (Lexapro) 20 MG tablet Take 1 tablet by mouth Daily.    SUMAtriptan (Imitrex) 50 MG tablet Take one tablet at onset of headache. May repeat dose one time in 2 hours if headache not relieved.    [DISCONTINUED] escitalopram (LEXAPRO) 10 MG tablet TAKE 1 TABLET BY MOUTH DAILY (Patient taking differently: Take 2 tablets by mouth Daily.)     No current facility-administered medications on file prior to visit.        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    Intertrigo     using nystatin    Shoulder joint pain 08/05/2013       Past Surgical  History:   Procedure Laterality Date    CHOLECYSTECTOMY WITH INTRAOPERATIVE CHOLANGIOGRAM N/A 01/27/2023    Procedure: LAPAROSCOPIC CHOLECYSTECTOMY WITH INTRAOPERATIVE CHOLANGIOGRAM, POSSIBLE OPEN;  Surgeon: Mary Blanton MD;  Location: [facility] MAIN OR;  Service: General;  Laterality: N/A;    ENDOSCOPY N/A 09/21/2022    Procedure: ESOPHAGOGASTRODUODENOSCOPY WITH COLD BIOPSIES AND WITH BALLOON DILATATION 18MM-20MM;  Surgeon: Luis A Galindo MD;  [place] [facility] [place];  Service: Gastroenterology;  Laterality: N/A;  S. RING, ESOPHAGITIS       Family History   Problem Relation Age of Onset    Breast cancer Mother 47    Ulcerative colitis Father     Arthritis Father     Irritable bowel syndrome Maternal Grandmother     Diabetes Maternal Grandmother     Hypertension Maternal Grandmother     Thyroid disease Maternal Grandmother     Migraines Maternal Grandmother     Diverticulosis Maternal Grandmother     Heart failure Maternal Grandfather     Diabetes Maternal Grandfather     Osteoarthritis Maternal Grandfather     Asthma Maternal Grandfather     Hyperlipidemia Maternal Grandfather     Hypertension Maternal Grandfather     Colon cancer Paternal Grandfather     Colon polyps Neg Hx     Crohn's disease Neg Hx     Malig Hyperthermia Neg Hx        Social History     Socioeconomic History    Marital status:    Tobacco Use    Smoking status: Never    Smokeless tobacco: Never   Vaping Use    Vaping status: Every Day    Substances: Nicotine   Substance and Sexual Activity    Alcohol use: No    Drug use: Never    Sexual activity: Not Currently       Review of Systems   Constitutional:  Negative for chills, fever, unexpected weight gain and unexpected weight loss. Appetite change: some decrease in appetite. Fatigue: some, not necessarily unexplained fatigue.  HENT:  Negative for ear pain, sore throat and trouble swallowing. Sinus pressure: chronic, no change.   Eyes:  Negative for blurred vision.   Respiratory:   "Negative for chest tightness. Cough: some on occasion, productive at times. Shortness of breath: some at times, will feel like cannot get full breath.   Cardiovascular:  Negative for chest pain, palpitations (see HPI) and leg swelling.   Gastrointestinal:  Negative for abdominal pain, blood in stool, GERD and indigestion. Constipation: some at times, attributes to diet, so has tried to eat better. Diarrhea: some at times, attributes to diet.  Endocrine: Polydipsia: some recently.   Genitourinary:  Negative for dysuria and frequency.   Musculoskeletal:  Arthralgias: some in right hip on occasion. Back pain: has some chronic back pain, different from when went to ER; breasts much larger after had kids and contributes to back pain; also has history of back fracture; using brace helps some; occasional sciatica on left.   Skin:  Rash: see HPI.   Neurological:  Negative for dizziness and light-headedness (some if stands up too fast). Headache: see HPI.      Objective   Vitals:    08/13/24 0921   BP: 116/72   BP Location: Left arm   Patient Position: Sitting   Cuff Size: Adult   Pulse: 82   Temp: 97 °F (36.1 °C)   SpO2: 98%   Weight: 129 kg (285 lb)   Height: 167.6 cm (66\")     Body mass index is 46 kg/m².    Physical Exam  Vitals and nursing note reviewed.   Constitutional:       General: She is not in acute distress.     Appearance: She is well-developed and well-groomed. She is not diaphoretic.   HENT:      Head: Normocephalic.      Right Ear: Tympanic membrane and external ear normal. No decreased hearing noted.      Left Ear: Tympanic membrane and external ear normal. No decreased hearing noted.      Nose: Nose normal.      Right Sinus: No maxillary sinus tenderness or frontal sinus tenderness.      Left Sinus: No maxillary sinus tenderness or frontal sinus tenderness.      Mouth/Throat:      Mouth: Mucous membranes are moist.      Pharynx: No oropharyngeal exudate or posterior oropharyngeal erythema.   Eyes:      " Extraocular Movements: Extraocular movements intact.      Conjunctiva/sclera: Conjunctivae normal.      Pupils: Pupils are equal, round, and reactive to light.   Neck:      Vascular: No carotid bruit.   Cardiovascular:      Rate and Rhythm: Normal rate and regular rhythm.      Pulses: Normal pulses.      Heart sounds: Normal heart sounds. No murmur heard.  Pulmonary:      Effort: Pulmonary effort is normal. No respiratory distress.      Breath sounds: Normal breath sounds.   Abdominal:      General: Bowel sounds are normal.      Palpations: Abdomen is soft. There is no hepatomegaly or splenomegaly.      Tenderness: There is abdominal tenderness (mild LLQ). There is no guarding or rebound.   Musculoskeletal:      Cervical back: Normal range of motion and neck supple. No bony tenderness.      Thoracic back: No bony tenderness.      Lumbar back: No bony tenderness. Abnormal right straight leg raise test: discomfort in lower back and down right leg just past knee with SLR at 45 degrees. Abnormal left straight leg raise test: positive SLR on left with SLR at 30 degrees.      Right hip: No bony tenderness. Decreased range of motion: some due to discomfort.      Left hip: Bony tenderness: mild with palpation of lateral hip. Normal range of motion.      Right lower leg: No edema.      Left lower leg: No edema.   Lymphadenopathy:      Cervical: No cervical adenopathy.   Skin:     General: Skin is warm and dry.   Neurological:      Mental Status: She is alert and oriented to person, place, and time.      Cranial Nerves: Cranial nerves 2-12 are intact.      Gait: Gait normal.      Deep Tendon Reflexes:      Reflex Scores:       Patellar reflexes are 2+ on the right side and 2+ on the left side.  Psychiatric:         Mood and Affect: Mood normal.         Behavior: Behavior normal.         Thought Content: Thought content normal.         Cognition and Memory: Cognition normal.         Judgment: Judgment normal.         Lab  Results   Component Value Date    WBC 6.7 04/12/2024    RBC 4.95 04/12/2024    HGB 13.8 04/12/2024    HCT 41.8 04/12/2024    MCV 84 04/12/2024    MCH 27.9 04/12/2024    MCHC 33.0 04/12/2024    RDW 12.9 04/12/2024     04/12/2024    NEUTRORELPCT 61 04/12/2024    LYMPHORELPCT 28 04/12/2024    MONORELPCT 7 04/12/2024    EOSRELPCT 3 04/12/2024    BASORELPCT 1 04/12/2024    NEUTROABS 4.6 07/28/2024    LYMPHSABS 1.9 04/12/2024    MONOSABS 0.5 04/12/2024    EOSABS 0.0 07/28/2024    BASOSABS 0.1 07/28/2024     Lab Results   Component Value Date    GLUCOSE 105 (H) 04/12/2024    BUN 11 04/12/2024    CREATININE 0.76 04/12/2024    BCR 14 04/12/2024    K 4.0 04/12/2024    CO2 24 04/12/2024    CALCIUM 9.2 04/12/2024    PROTENTOTREF 6.7 04/12/2024    ALBUMIN 4.1 04/12/2024    LABIL2 1.6 04/12/2024    AST 17 04/12/2024    ALT 20 04/12/2024      Lab Results   Component Value Date    CHLPL 171 10/18/2023    TRIG 168 (H) 10/18/2023    HDL 39 (L) 10/18/2023    VLDL 29 10/18/2023     (H) 10/18/2023     Lab Results   Component Value Date    TSH 2.330 04/12/2024     Lab Results   Component Value Date    HGBA1C 5.2 05/11/2022     Lab Results   Component Value Date    COLORU Yellow 09/13/2022    CLARITYU Clear 09/13/2022    LEUKOCYTESUR Large (3+) (A) 09/13/2022    BILIRUBINUR Negative 09/13/2022      Records reviewed from  of [facility] on 7/28/24.  Had CT head and WNL. Symptoms improved with migraine cocktail.  Diagnosed with migraine and discharged home.    7/28/24 CT head: FINDINGS: Major findings:No acute intracranial hemorrhage, space-occupying mass, mass effect, midline shift, hydrocephalus or cortical based acute stroke. Minor findings:The ventricles and sulci are appropriate for age. Normal gray-white matter differentiation is noted. Basal ganglia, brainstem and posterior fossa are grossly unremarkable.Incidental findings:Limited imaging of the orbits and ocular structures are unremarkable. Paranasal sinuses and  mastoid air cells do not demonstrate any significant abnormality.IMPRESSION: No demonstrable acute intracranial abnormality.    7/28/24 BMP WNL other than calcium 8.4; CBC WNL    Assessment    Problem List Items Addressed This Visit       Anxiety    Current Assessment & Plan     Stable.  Continue Escitalopram daily.         Moderate episode of recurrent major depressive disorder    Current Assessment & Plan     Patient's depression is a recurrent episode that is moderate without psychosis. Depression is active and stable.    Plan:   Continue current medication therapy   Continue Escitalopram daily.    Followup at the next regular appointment.          Relevant Medications    hydrOXYzine (ATARAX) 25 MG tablet    Palpitations    Current Assessment & Plan     Stable.  Continue Atenolol daily.  Reschedule appointment with cardiology.         Allergic rhinitis    Current Assessment & Plan     Try Xyzal daily instead of Cetirizine.         Migraine - Primary    Current Assessment & Plan     Increase intake of clear liquids, particularly before and after being out in heat.  Keep headache diary.  Resume CPAP nightly.  Try Sumatriptan with onset of headache.         Sleep apnea    Current Assessment & Plan     Resume CPAP nightly.         Dermatitis    Relevant Orders    Ambulatory Referral to Dermatology    Itching    Current Assessment & Plan     Try Xyzal daily instead of Cetirizine.  May try Hydroxyzine at bedtime instead.         Relevant Medications    hydrOXYzine (ATARAX) 25 MG tablet    Chronic bilateral low back pain with left-sided sciatica    Current Assessment & Plan     Continue heat to lower back as needed.         Relevant Orders    Ambulatory Referral to Physical Therapy for Evaluation & Treatment    Insomnia    Current Assessment & Plan     May try Hydroxyzine as needed.         Relevant Medications    hydrOXYzine (ATARAX) 25 MG tablet        Return in about 2 months (around 10/13/2024) for Annual physical,  Recheck afer 10/18/24.or sooner if symptoms persist or worsen.

## 2024-08-13 NOTE — PATIENT INSTRUCTIONS
Try Xyzal daily instead of Cetirizine.  Increase intake of clear liquids, particularly before and after being out in heat.  Keep headache diary.  Resume CPAP nightly.  Continue heat to lower back as needed.  Follow up in 2 months, or sooner if symptoms persist or worsen.

## 2024-08-15 PROBLEM — G89.29 CHRONIC BILATERAL LOW BACK PAIN WITH LEFT-SIDED SCIATICA: Status: ACTIVE | Noted: 2024-08-15

## 2024-08-15 PROBLEM — G47.00 INSOMNIA: Status: ACTIVE | Noted: 2024-08-15

## 2024-08-15 PROBLEM — M54.42 CHRONIC BILATERAL LOW BACK PAIN WITH LEFT-SIDED SCIATICA: Status: ACTIVE | Noted: 2024-08-15

## 2024-08-15 PROBLEM — G43.909 MIGRAINE: Status: ACTIVE | Noted: 2023-03-15

## 2024-08-15 NOTE — ASSESSMENT & PLAN NOTE
Patient's depression is a recurrent episode that is moderate without psychosis. Depression is active and stable.    Plan:   Continue current medication therapy   Continue Escitalopram daily.    Followup at the next regular appointment.

## 2024-08-15 NOTE — ASSESSMENT & PLAN NOTE
Increase intake of clear liquids, particularly before and after being out in heat.  Keep headache diary.  Resume CPAP nightly.  Try Sumatriptan with onset of headache.

## 2024-08-27 ENCOUNTER — OFFICE VISIT (OUTPATIENT)
Dept: FAMILY MEDICINE CLINIC | Facility: CLINIC | Age: 24
End: 2024-08-27
Payer: OTHER GOVERNMENT

## 2024-08-27 VITALS
DIASTOLIC BLOOD PRESSURE: 74 MMHG | HEIGHT: 66 IN | BODY MASS INDEX: 45.48 KG/M2 | TEMPERATURE: 97 F | SYSTOLIC BLOOD PRESSURE: 124 MMHG | HEART RATE: 98 BPM | WEIGHT: 283 LBS | OXYGEN SATURATION: 99 %

## 2024-08-27 DIAGNOSIS — R10.32 BILATERAL LOWER ABDOMINAL DISCOMFORT: ICD-10-CM

## 2024-08-27 DIAGNOSIS — N92.6 IRREGULAR MENSES: ICD-10-CM

## 2024-08-27 DIAGNOSIS — N92.6 MISSED MENSES: Primary | ICD-10-CM

## 2024-08-27 DIAGNOSIS — R10.31 BILATERAL LOWER ABDOMINAL DISCOMFORT: ICD-10-CM

## 2024-08-27 PROCEDURE — 99214 OFFICE O/P EST MOD 30 MIN: CPT | Performed by: NURSE PRACTITIONER

## 2024-08-27 NOTE — PROGRESS NOTES
Subjective   Clair Overton is a 24 y.o. female.     Chief Complaint   Patient presents with    positive pregnancy test     5 days late        Answers submitted by the patient for this visit:  Other (Submitted on 8/27/2024)  Please describe your symptoms.: Faint positive pregnancy test with bleeding  Have you had these symptoms before?: No  How long have you been having these symptoms?: 1-4 days  Please list any medications you are currently taking for this condition.: N/A  Please describe any probable cause for these symptoms. : Unknown  Primary Reason for Visit (Submitted on 8/27/2024)  What is the primary reason for your visit?: Other    History of Present Illness   Patient presents with c/o missed menses; currently 5 days late; had positive home pregnancy test with faint line yesterday; typically has regular menses; had 2 menses last month and that has not happened before; had breast tenderness last week; some decreased appetite last week; still having some symptoms this week; will feel hot at times; had some spotting 1 hour after taking pregnancy test yesterday, but now some more bleeding with some small clots; some cramping on way to office visit; had some sharp pain in left side last week, but resolved; has been trying to get pregnant for last couple of months.    F/U migraine: no recent headache; has not needed to take Sumatriptan.    F/U itching: takes Zyrtec daily and works well; only minor itching when taking Zyrtec consistently; has not tried Xyzal at this point.    Has had some trouble falling asleep some nights; has not tried Hydroxyzine at this point.      The following portions of the patient's history were reviewed and updated as appropriate: allergies, current medications, past family history, past medical history, past social history, past surgical history and problem list.    Current Outpatient Medications on File Prior to Visit   Medication Sig    atenolol (TENORMIN) 25 MG tablet TAKE 1 TABLET  BY MOUTH EVERY NIGHT    cetirizine (zyrTEC) 10 MG tablet Take 1 tablet by mouth Daily.    escitalopram (Lexapro) 20 MG tablet Take 1 tablet by mouth Daily.    SUMAtriptan (Imitrex) 50 MG tablet Take one tablet at onset of headache. May repeat dose one time in 2 hours if headache not relieved.    desonide (DesOwen) 0.05 % cream Apply 1 Application topically to the appropriate area as directed 2 (Two) Times a Day As Needed for Irritation. (Patient not taking: Reported on 8/27/2024)    hydrOXYzine (ATARAX) 25 MG tablet Take 1 tablet by mouth At Night As Needed for Itching (sleep). (Patient not taking: Reported on 8/27/2024)    [DISCONTINUED] escitalopram (LEXAPRO) 10 MG tablet TAKE 1 TABLET BY MOUTH DAILY (Patient taking differently: Take 2 tablets by mouth Daily.)     No current facility-administered medications on file prior to visit.        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    Intertrigo     using nystatin    Shoulder joint pain 08/05/2013       Past Surgical History:   Procedure Laterality Date    CHOLECYSTECTOMY      CHOLECYSTECTOMY WITH INTRAOPERATIVE CHOLANGIOGRAM N/A 01/27/2023    Procedure: LAPAROSCOPIC CHOLECYSTECTOMY WITH INTRAOPERATIVE CHOLANGIOGRAM, POSSIBLE OPEN;  Surgeon: Mary Blanton MD;  Location: [facility] MAIN OR;  Service: General;  Laterality: N/A;    ENDOSCOPY N/A 09/21/2022    Procedure: ESOPHAGOGASTRODUODENOSCOPY WITH COLD BIOPSIES AND WITH BALLOON DILATATION 18MM-20MM;  Surgeon: Luis A Galindo MD;  Location: [facility] MAIN OR;  Service: Gastroenterology;  Laterality: N/A;  S. RING, ESOPHAGITIS       Family History   Problem Relation Age of Onset    Breast cancer Mother 47    Ulcerative colitis Father     Arthritis Father     Irritable bowel  "syndrome Maternal Grandmother     Diabetes Maternal Grandmother     Hypertension Maternal Grandmother     Thyroid disease Maternal Grandmother     Migraines Maternal Grandmother     Diverticulosis Maternal Grandmother     Heart failure Maternal Grandfather     Diabetes Maternal Grandfather     Osteoarthritis Maternal Grandfather     Asthma Maternal Grandfather     Hyperlipidemia Maternal Grandfather     Hypertension Maternal Grandfather     Colon cancer Paternal Grandfather     Colon polyps Neg Hx     Crohn's disease Neg Hx     Malig Hyperthermia Neg Hx        Social History     Socioeconomic History    Marital status:    Tobacco Use    Smoking status: Never    Smokeless tobacco: Never   Vaping Use    Vaping status: Every Day    Substances: Nicotine   Substance and Sexual Activity    Alcohol use: No    Drug use: Never    Sexual activity: Yes     Partners: Male     Birth control/protection: None       Review of Systems   Constitutional:  Positive for fatigue. Negative for chills, fever, unexpected weight gain and unexpected weight loss. Appetite change: some decrease.  HENT:  Negative for trouble swallowing.    Respiratory:  Negative for cough, chest tightness and shortness of breath.    Cardiovascular:  Negative for chest pain, palpitations (Atenolol works well) and leg swelling.   Gastrointestinal:  Negative for constipation, diarrhea, GERD and indigestion. Abdominal pain: see HPI.  Genitourinary:  Positive for frequency (some). Negative for dysuria.   Skin:  Negative for rash.   Neurological:  Negative for dizziness, syncope and light-headedness (only when in heat).       Objective   Vitals:    08/27/24 1108 08/27/24 1145   BP: 124/74    BP Location: Left arm    Patient Position: Sitting    Cuff Size: Adult    Pulse: 103 98   Temp: 97 °F (36.1 °C)    SpO2: 99%    Weight: 128 kg (283 lb)    Height: 167.6 cm (66\")      Body mass index is 45.68 kg/m².    Physical Exam  Vitals and nursing note reviewed. "   Constitutional:       General: She is not in acute distress.     Appearance: She is well-developed and well-groomed. She is not diaphoretic.   HENT:      Head: Normocephalic.      Right Ear: External ear normal.      Left Ear: External ear normal.   Eyes:      Conjunctiva/sclera: Conjunctivae normal.   Neck:      Vascular: No carotid bruit.   Cardiovascular:      Rate and Rhythm: Normal rate and regular rhythm.      Pulses: Normal pulses.      Heart sounds: Normal heart sounds. No murmur heard.  Pulmonary:      Effort: Pulmonary effort is normal. No respiratory distress.      Breath sounds: Normal breath sounds.   Abdominal:      General: Bowel sounds are normal.      Palpations: Abdomen is soft. There is no hepatomegaly or splenomegaly.      Tenderness: There is abdominal tenderness (mild lower abdominal tenderness). There is no guarding.   Musculoskeletal:      Cervical back: Normal range of motion and neck supple.      Right lower leg: No edema.      Left lower leg: No edema.   Skin:     General: Skin is warm and dry.      Findings: No rash.   Neurological:      Mental Status: She is alert and oriented to person, place, and time.      Gait: Gait normal.   Psychiatric:         Mood and Affect: Mood normal.         Behavior: Behavior normal.         Thought Content: Thought content normal.         Cognition and Memory: Cognition normal.         Judgment: Judgment normal.         Lab Results   Component Value Date    WBC 6.7 04/12/2024    RBC 4.95 04/12/2024    HGB 13.8 04/12/2024    HCT 41.8 04/12/2024    MCV 84 04/12/2024    MCH 27.9 04/12/2024    MCHC 33.0 04/12/2024    RDW 12.9 04/12/2024     04/12/2024    NEUTRORELPCT 61 04/12/2024    LYMPHORELPCT 28 04/12/2024    MONORELPCT 7 04/12/2024    EOSRELPCT 3 04/12/2024    BASORELPCT 1 04/12/2024    NEUTROABS 4.6 07/28/2024    LYMPHSABS 1.9 04/12/2024    MONOSABS 0.5 04/12/2024    EOSABS 0.0 07/28/2024    BASOSABS 0.1 07/28/2024     Lab Results   Component  Value Date    GLUCOSE 105 (H) 04/12/2024    BUN 11 04/12/2024    CREATININE 0.76 04/12/2024    BCR 14 04/12/2024    K 4.0 04/12/2024    CO2 24 04/12/2024    CALCIUM 9.2 04/12/2024    PROTENTOTREF 6.7 04/12/2024    ALBUMIN 4.1 04/12/2024    LABIL2 1.6 04/12/2024    AST 17 04/12/2024    ALT 20 04/12/2024      Lab Results   Component Value Date    CHLPL 171 10/18/2023    TRIG 168 (H) 10/18/2023    HDL 39 (L) 10/18/2023    VLDL 29 10/18/2023     (H) 10/18/2023     Lab Results   Component Value Date    TSH 2.330 04/12/2024     Lab Results   Component Value Date    HGBA1C 5.2 05/11/2022     Lab Results   Component Value Date    COLORU Yellow 09/13/2022    CLARITYU Clear 09/13/2022    LEUKOCYTESUR Large (3+) (A) 09/13/2022    BILIRUBINUR Negative 09/13/2022          Assessment    Problem List Items Addressed This Visit       Missed menses - Primary    Relevant Orders    HCG, B-subunit, Quantitative    TSH Rfx On Abnormal To Free T4    Bilateral lower abdominal discomfort    Relevant Orders    CBC & Differential    Comprehensive Metabolic Panel     Other Visit Diagnoses       Irregular menses        Relevant Orders    TSH Rfx On Abnormal To Free T4             Return if symptoms worsen or fail to improve.  Patient with missed menses 5 days ago and positive home pregnancy test with spotting yesterday and more bleeding today; will check labs today for further evaluation of symptoms; will consider changing Atenolol pending lab results; also discussed need to try to wean off Escitalopram before 3rd trimester.

## 2024-08-27 NOTE — PATIENT INSTRUCTIONS
Increase intake of clear liquids and rest.  Follow up pending lab results.  Follow up if symptoms persist or worsen.

## 2024-08-28 ENCOUNTER — TELEPHONE (OUTPATIENT)
Dept: FAMILY MEDICINE CLINIC | Facility: CLINIC | Age: 24
End: 2024-08-28
Payer: OTHER GOVERNMENT

## 2024-08-28 DIAGNOSIS — N92.6 IRREGULAR MENSES: Primary | ICD-10-CM

## 2024-08-28 LAB
ALBUMIN SERPL-MCNC: 4 G/DL (ref 4–5)
ALP SERPL-CCNC: 88 IU/L (ref 44–121)
ALT SERPL-CCNC: 11 IU/L (ref 0–32)
AST SERPL-CCNC: 15 IU/L (ref 0–40)
BASOPHILS # BLD AUTO: 0.1 X10E3/UL (ref 0–0.2)
BASOPHILS NFR BLD AUTO: 1 %
BILIRUB SERPL-MCNC: 0.5 MG/DL (ref 0–1.2)
BUN SERPL-MCNC: 11 MG/DL (ref 6–20)
BUN/CREAT SERPL: 15 (ref 9–23)
CALCIUM SERPL-MCNC: 9.3 MG/DL (ref 8.7–10.2)
CHLORIDE SERPL-SCNC: 102 MMOL/L (ref 96–106)
CO2 SERPL-SCNC: 21 MMOL/L (ref 20–29)
CREAT SERPL-MCNC: 0.73 MG/DL (ref 0.57–1)
EGFRCR SERPLBLD CKD-EPI 2021: 116 ML/MIN/1.73
EOSINOPHIL # BLD AUTO: 0.4 X10E3/UL (ref 0–0.4)
EOSINOPHIL NFR BLD AUTO: 6 %
ERYTHROCYTE [DISTWIDTH] IN BLOOD BY AUTOMATED COUNT: 12.9 % (ref 11.7–15.4)
GLOBULIN SER CALC-MCNC: 2.6 G/DL (ref 1.5–4.5)
GLUCOSE SERPL-MCNC: 82 MG/DL (ref 70–99)
HCG INTACT+B SERPL-ACNC: <1 MIU/ML
HCT VFR BLD AUTO: 42.2 % (ref 34–46.6)
HGB BLD-MCNC: 13.3 G/DL (ref 11.1–15.9)
IMM GRANULOCYTES # BLD AUTO: 0 X10E3/UL (ref 0–0.1)
IMM GRANULOCYTES NFR BLD AUTO: 0 %
LYMPHOCYTES # BLD AUTO: 1.8 X10E3/UL (ref 0.7–3.1)
LYMPHOCYTES NFR BLD AUTO: 25 %
MCH RBC QN AUTO: 28.4 PG (ref 26.6–33)
MCHC RBC AUTO-ENTMCNC: 31.5 G/DL (ref 31.5–35.7)
MCV RBC AUTO: 90 FL (ref 79–97)
MONOCYTES # BLD AUTO: 0.6 X10E3/UL (ref 0.1–0.9)
MONOCYTES NFR BLD AUTO: 8 %
NEUTROPHILS # BLD AUTO: 4.3 X10E3/UL (ref 1.4–7)
NEUTROPHILS NFR BLD AUTO: 60 %
PLATELET # BLD AUTO: 367 X10E3/UL (ref 150–450)
POTASSIUM SERPL-SCNC: 4.4 MMOL/L (ref 3.5–5.2)
PROT SERPL-MCNC: 6.6 G/DL (ref 6–8.5)
RBC # BLD AUTO: 4.68 X10E6/UL (ref 3.77–5.28)
SODIUM SERPL-SCNC: 138 MMOL/L (ref 134–144)
TSH SERPL DL<=0.005 MIU/L-ACNC: 2 UIU/ML (ref 0.45–4.5)
WBC # BLD AUTO: 7.2 X10E3/UL (ref 3.4–10.8)

## 2024-08-28 NOTE — TELEPHONE ENCOUNTER
I called and spoke with the patient and let her know that we seen were her labs were done but that they had not been reviewed. I let her know that as soon as they were looked at someone would give her a call. She verbalized understanding and will call back if needed.

## 2024-08-28 NOTE — TELEPHONE ENCOUNTER
Caller: Clair Overton    Relationship: Self    Best call back number:    [phone]        Caller requesting test results:     What test was performed:BLOOD WORK    When was the test performed: 08/27/24    Where was the test performed: IN OFFICE    Additional notes: PATIENT CALLING WANTING TO KNOW IF THE LABS HAVE BEEN REVIEWED.

## 2024-09-18 ENCOUNTER — TREATMENT (OUTPATIENT)
Dept: PHYSICAL THERAPY | Facility: CLINIC | Age: 24
End: 2024-09-18
Payer: OTHER GOVERNMENT

## 2024-09-18 DIAGNOSIS — M54.42 CHRONIC BILATERAL LOW BACK PAIN WITH LEFT-SIDED SCIATICA: Primary | ICD-10-CM

## 2024-09-18 DIAGNOSIS — G89.29 CHRONIC BILATERAL LOW BACK PAIN WITH LEFT-SIDED SCIATICA: Primary | ICD-10-CM

## 2024-09-18 PROCEDURE — 97110 THERAPEUTIC EXERCISES: CPT | Performed by: PHYSICAL THERAPIST

## 2024-09-18 PROCEDURE — 97530 THERAPEUTIC ACTIVITIES: CPT | Performed by: PHYSICAL THERAPIST

## 2024-09-18 PROCEDURE — 97161 PT EVAL LOW COMPLEX 20 MIN: CPT | Performed by: PHYSICAL THERAPIST

## 2024-09-26 ENCOUNTER — TREATMENT (OUTPATIENT)
Dept: PHYSICAL THERAPY | Facility: CLINIC | Age: 24
End: 2024-09-26
Payer: OTHER GOVERNMENT

## 2024-09-26 DIAGNOSIS — G89.29 CHRONIC BILATERAL LOW BACK PAIN WITH LEFT-SIDED SCIATICA: Primary | ICD-10-CM

## 2024-09-26 DIAGNOSIS — M54.42 CHRONIC BILATERAL LOW BACK PAIN WITH LEFT-SIDED SCIATICA: Primary | ICD-10-CM

## 2024-09-26 PROCEDURE — 97530 THERAPEUTIC ACTIVITIES: CPT | Performed by: PHYSICAL THERAPIST

## 2024-09-26 PROCEDURE — 97110 THERAPEUTIC EXERCISES: CPT | Performed by: PHYSICAL THERAPIST

## 2024-10-10 ENCOUNTER — TREATMENT (OUTPATIENT)
Dept: PHYSICAL THERAPY | Facility: CLINIC | Age: 24
End: 2024-10-10
Payer: OTHER GOVERNMENT

## 2024-10-10 DIAGNOSIS — M54.42 CHRONIC BILATERAL LOW BACK PAIN WITH LEFT-SIDED SCIATICA: Primary | ICD-10-CM

## 2024-10-10 DIAGNOSIS — G89.29 CHRONIC BILATERAL LOW BACK PAIN WITH LEFT-SIDED SCIATICA: Primary | ICD-10-CM

## 2024-10-10 PROCEDURE — 97110 THERAPEUTIC EXERCISES: CPT | Performed by: PHYSICAL THERAPIST

## 2024-10-10 PROCEDURE — 97530 THERAPEUTIC ACTIVITIES: CPT | Performed by: PHYSICAL THERAPIST

## 2024-10-10 NOTE — PROGRESS NOTES
"Physical Therapy Daily Treatment Note               [address]                                                                                                                                             [place] [address]    Patient: Clair Overton   : 2000  Referring practitioner: MARY Gaona  Date of Initial Visit: Type: THERAPY  Noted: 2024  Today's Date: 10/10/2024  Patient seen for 3 sessions       Visit Diagnoses:    ICD-10-CM ICD-9-CM   1. Chronic bilateral low back pain with left-sided sciatica  M54.42 724.2    G89.29 724.3     338.29       Subjective Evaluation    History of Present Illness    Subjective comment: Pt reports that her lumbar is a little sore today. \"my daugther is on fall break, so I've been more active.\"Pain  Current pain ratin           Objective   See Exercise, Manual, and Modality Logs for complete treatment.   Added supine straight leg piriformis stretch    Assessment & Plan       Assessment  Assessment details: Pt completed treatment with no c/o pain in her low back.  She did experience \"pulling\" and popping in her (R) hip and SI.  She was able to progress her dynamic core stability exercises today.  Will plan to progress per POC.            Timed:         Manual Therapy:    0     mins  75405;     Therapeutic Exercise:    22     mins  96445;     Neuromuscular Carroll:    0    mins  55013;    Therapeutic Activity:     10     mins  79867;     Gait Trainin     mins  30156;     Ultrasound:     0     mins  08851;    E Stim                            0    mins   45342( [id number])  Work Cat/Cond      0    mins   22654        Timed Treatment:   32   mins   Total Treatment:     32   mins    Armand Parekh PTA  [place] License: [id number]  "

## 2024-10-17 ENCOUNTER — TELEPHONE (OUTPATIENT)
Dept: ORTHOPEDICS | Facility: OTHER | Age: 24
End: 2024-10-17
Payer: OTHER GOVERNMENT

## 2024-10-17 DIAGNOSIS — G47.00 INSOMNIA, UNSPECIFIED TYPE: ICD-10-CM

## 2024-10-17 DIAGNOSIS — L29.9 ITCHING: ICD-10-CM

## 2024-10-18 RX ORDER — HYDROXYZINE HYDROCHLORIDE 25 MG/1
TABLET, FILM COATED ORAL
Qty: 96.42 TABLET | Refills: 0 | Status: SHIPPED | OUTPATIENT
Start: 2024-10-18

## 2024-10-18 NOTE — TELEPHONE ENCOUNTER
Rx Refill Note  Requested Prescriptions     Pending Prescriptions Disp Refills    hydrOXYzine (ATARAX) 25 MG tablet [Pharmacy Med Name: HYDROXYZINE HCL 25MG TABS (WHITE)] 30 tablet 0     Sig: TAKE 1 TABLET BY MOUTH AT NIGHT AS NEEDED FOR ITCHING OR SLEEP      Last office visit with prescribing clinician: 8/27/2024   Last telemedicine visit with prescribing clinician: Visit date not found   Next office visit with prescribing clinician: 10/21/2024                         Would you like a call back once the refill request has been completed: [] Yes [] No    If the office needs to give you a call back, can they leave a voicemail: [] Yes [] No    Neena Mrar MA  10/18/24, 11:02 EDT

## 2024-10-22 ENCOUNTER — TREATMENT (OUTPATIENT)
Dept: PHYSICAL THERAPY | Facility: CLINIC | Age: 24
End: 2024-10-22
Payer: OTHER GOVERNMENT

## 2024-10-22 DIAGNOSIS — G89.29 CHRONIC BILATERAL LOW BACK PAIN WITH LEFT-SIDED SCIATICA: Primary | ICD-10-CM

## 2024-10-22 DIAGNOSIS — M54.42 CHRONIC BILATERAL LOW BACK PAIN WITH LEFT-SIDED SCIATICA: Primary | ICD-10-CM

## 2024-10-22 PROCEDURE — 97112 NEUROMUSCULAR REEDUCATION: CPT | Performed by: PHYSICAL THERAPIST

## 2024-10-22 PROCEDURE — 97530 THERAPEUTIC ACTIVITIES: CPT | Performed by: PHYSICAL THERAPIST

## 2024-10-22 PROCEDURE — 97110 THERAPEUTIC EXERCISES: CPT | Performed by: PHYSICAL THERAPIST

## 2024-11-07 ENCOUNTER — TREATMENT (OUTPATIENT)
Dept: PHYSICAL THERAPY | Facility: CLINIC | Age: 24
End: 2024-11-07
Payer: OTHER GOVERNMENT

## 2024-11-07 DIAGNOSIS — M54.42 CHRONIC BILATERAL LOW BACK PAIN WITH LEFT-SIDED SCIATICA: Primary | ICD-10-CM

## 2024-11-07 DIAGNOSIS — G89.29 CHRONIC BILATERAL LOW BACK PAIN WITH LEFT-SIDED SCIATICA: Primary | ICD-10-CM

## 2024-11-07 PROCEDURE — 97110 THERAPEUTIC EXERCISES: CPT | Performed by: PHYSICAL THERAPIST

## 2024-11-07 PROCEDURE — 97112 NEUROMUSCULAR REEDUCATION: CPT | Performed by: PHYSICAL THERAPIST

## 2024-11-07 NOTE — PROGRESS NOTES
Physical Therapy Daily Treatment Note      [address], [address], [place] [id number]    Patient: Clair Overton   : 2000  Referring practitioner: MARY Gaona  Date of Initial Visit: Type: THERAPY  Noted: 2024  Today's Date: 2024  Patient seen for 5 sessions         Visit Diagnoses:     ICD-10-CM ICD-9-CM   1. Chronic bilateral low back pain with left-sided sciatica  M54.42 724.2    G89.29 724.3     338.29         Subjective Evaluation    History of Present Illness    Subjective comment: My back is doing a lot better overall.  I still have some trouble with it sometimes when I'm laying down, but it's not bothering me much of the rest of the time anymore.  I can play with my kids better and it's not really bothering me to sit or stand for long periods now.       Objective   See Exercise, Manual, and Modality Logs for complete treatment.   *Added supported dead bugs    Assessment & Plan       Assessment  Assessment details: At this time patient is consistently reporting minimal lumbar symptoms, and she no longer feels limited in activity, mobility, or positional tolerance due to lumbar symptoms.  (L) LE radicular symptoms have resolved.  She reports an improved QOL and ability to play with her children now.  She is independent with a progressed lumbar stabilization and core strengthening HEP at this time and is agreeable to transition to independence with said HEP.  All questions are answered to her satisfaction.          Other - allow patient to work independently with HEP.          Timed:  Manual Therapy:         mins  40806;  Therapeutic Exercise:    10     mins  54726;     Neuromuscular Carroll:    29    mins  62869;    Therapeutic Activity:          mins  01023;     Gait Training:           mins  10444;     Ultrasound:          mins  20264;    Untimed:  Electrical Stimulation:         mins  93858 ( );  Mechanical Traction:         mins  21920;   Dry Needling               ___  mins   66790    Timed Treatment:   39   mins   Total Treatment:     39   mins    Loren Curiel PT, DPT, OCS  Physical Therapist  [place] License #[id number]  Electronically signed by: Loren Curiel PT, 11/07/24, 11:28 AM EST

## 2024-12-10 ENCOUNTER — TELEPHONE (OUTPATIENT)
Dept: FAMILY MEDICINE CLINIC | Facility: CLINIC | Age: 24
End: 2024-12-10

## 2024-12-10 ENCOUNTER — OFFICE VISIT (OUTPATIENT)
Dept: FAMILY MEDICINE CLINIC | Facility: CLINIC | Age: 24
End: 2024-12-10
Payer: OTHER GOVERNMENT

## 2024-12-10 ENCOUNTER — HOSPITAL ENCOUNTER (OUTPATIENT)
Dept: GENERAL RADIOLOGY | Facility: HOSPITAL | Age: 24
Discharge: HOME | End: 2024-12-10
Admitting: NURSE PRACTITIONER
Payer: OTHER GOVERNMENT

## 2024-12-10 VITALS
DIASTOLIC BLOOD PRESSURE: 60 MMHG | WEIGHT: 287 LBS | HEART RATE: 118 BPM | TEMPERATURE: 101.2 F | HEIGHT: 66 IN | SYSTOLIC BLOOD PRESSURE: 116 MMHG | OXYGEN SATURATION: 96 % | BODY MASS INDEX: 46.12 KG/M2

## 2024-12-10 DIAGNOSIS — R07.89 CHEST TIGHTNESS: ICD-10-CM

## 2024-12-10 DIAGNOSIS — R50.9 FEVER, UNSPECIFIED FEVER CAUSE: ICD-10-CM

## 2024-12-10 DIAGNOSIS — R05.1 ACUTE COUGH: ICD-10-CM

## 2024-12-10 DIAGNOSIS — J06.9 UPPER RESPIRATORY TRACT INFECTION, UNSPECIFIED TYPE: Primary | ICD-10-CM

## 2024-12-10 DIAGNOSIS — R50.9 FEVER, UNSPECIFIED FEVER CAUSE: Primary | ICD-10-CM

## 2024-12-10 DIAGNOSIS — R00.0 TACHYCARDIA: ICD-10-CM

## 2024-12-10 LAB
EXPIRATION DATE: NORMAL
EXPIRATION DATE: NORMAL
FLUAV AG UPPER RESP QL IA.RAPID: NOT DETECTED
FLUBV AG UPPER RESP QL IA.RAPID: NOT DETECTED
INTERNAL CONTROL: NORMAL
INTERNAL CONTROL: NORMAL
Lab: NORMAL
Lab: NORMAL
S PYO AG THROAT QL: NEGATIVE
SARS-COV-2 AG UPPER RESP QL IA.RAPID: NOT DETECTED

## 2024-12-10 PROCEDURE — 99214 OFFICE O/P EST MOD 30 MIN: CPT | Performed by: NURSE PRACTITIONER

## 2024-12-10 PROCEDURE — 93000 ELECTROCARDIOGRAM COMPLETE: CPT | Performed by: NURSE PRACTITIONER

## 2024-12-10 PROCEDURE — 87880 STREP A ASSAY W/OPTIC: CPT | Performed by: NURSE PRACTITIONER

## 2024-12-10 PROCEDURE — 87428 SARSCOV & INF VIR A&B AG IA: CPT | Performed by: NURSE PRACTITIONER

## 2024-12-10 PROCEDURE — 71046 X-RAY EXAM CHEST 2 VIEWS: CPT

## 2024-12-10 RX ORDER — ACETAMINOPHEN 160 MG/5ML
500 LIQUID ORAL ONCE
Status: SHIPPED | OUTPATIENT
Start: 2024-12-10

## 2024-12-10 RX ORDER — ALBUTEROL SULFATE 90 UG/1
2 INHALANT RESPIRATORY (INHALATION) EVERY 6 HOURS PRN
Qty: 18 G | Refills: 0 | Status: SHIPPED | OUTPATIENT
Start: 2024-12-10

## 2024-12-10 NOTE — PROGRESS NOTES
Subjective   Clair Overton is a 24 y.o. female.     Chief Complaint   Patient presents with    Cough     X 3 days     Fever     100.2       History of Present Illness   Patient presents with c/o cough x3 days; had dry cough and would resolve with drinking water; then woke up this morning and chest felt really tight and hurts to cough; no sore throat; has discomfort in upper chest with cough; has had some productive cough--greenish; has had fever 100.2; some SOA and hard to talk; ears feel hot, no ear pain; brief nausea, no vomiting or diarrhea; no OTC treatment; daughter had fever 2 nights ago, resolved next day; drank part of [employer] energy drink at 0900.    LMP 12/2/24     was present during the history-taking and subsequent discussion with this patient.  Patient agrees to the presence of the individual during this visit.       The following portions of the patient's history were reviewed and updated as appropriate: allergies, current medications, past family history, past medical history, past social history, past surgical history and problem list.    Current Outpatient Medications on File Prior to Visit   Medication Sig    atenolol (TENORMIN) 25 MG tablet TAKE 1 TABLET BY MOUTH EVERY NIGHT    cetirizine (zyrTEC) 10 MG tablet Take 1 tablet by mouth Daily.    desonide (DesOwen) 0.05 % cream Apply 1 Application topically to the appropriate area as directed 2 (Two) Times a Day As Needed for Irritation. (Patient not taking: Reported on 12/10/2024)    escitalopram (Lexapro) 20 MG tablet Take 1 tablet by mouth Daily.    hydrOXYzine (ATARAX) 25 MG tablet TAKE 1 TABLET BY MOUTH AT NIGHT AS NEEDED FOR ITCHING OR SLEEP    SUMAtriptan (Imitrex) 50 MG tablet Take one tablet at onset of headache. May repeat dose one time in 2 hours if headache not relieved.    [DISCONTINUED] escitalopram (LEXAPRO) 10 MG tablet TAKE 1 TABLET BY MOUTH DAILY (Patient taking differently: Take 2 tablets by mouth Daily.)     No current  facility-administered medications on file prior to visit.        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    Intertrigo     using nystatin    Shoulder joint pain 08/05/2013       Past Surgical History:   Procedure Laterality Date    CHOLECYSTECTOMY      CHOLECYSTECTOMY WITH INTRAOPERATIVE CHOLANGIOGRAM N/A 01/27/2023    Procedure: LAPAROSCOPIC CHOLECYSTECTOMY WITH INTRAOPERATIVE CHOLANGIOGRAM, POSSIBLE OPEN;  Surgeon: Mary Blanton MD;  Location: [facility] MAIN OR;  Service: General;  Laterality: N/A;    ENDOSCOPY N/A 09/21/2022    Procedure: ESOPHAGOGASTRODUODENOSCOPY WITH COLD BIOPSIES AND WITH BALLOON DILATATION 18MM-20MM;  Surgeon: Luis A Galindo MD;  Location: [facility] MAIN OR;  Service: Gastroenterology;  Laterality: N/A;  S. RING, ESOPHAGITIS       Family History   Problem Relation Age of Onset    Breast cancer Mother 47    Ulcerative colitis Father     Arthritis Father     Irritable bowel syndrome Maternal Grandmother     Diabetes Maternal Grandmother     Hypertension Maternal Grandmother     Thyroid disease Maternal Grandmother     Migraines Maternal Grandmother     Diverticulosis Maternal Grandmother     Heart failure Maternal Grandfather     Diabetes Maternal Grandfather     Osteoarthritis Maternal Grandfather     Asthma Maternal Grandfather     Hyperlipidemia Maternal Grandfather     Hypertension Maternal Grandfather     Colon cancer Paternal Grandfather     Colon polyps Neg Hx     Crohn's disease Neg Hx     Malig Hyperthermia Neg Hx        Social History     Socioeconomic History    Marital status:    Tobacco Use    Smoking status: Never    Smokeless tobacco: Never   Vaping Use    Vaping status: Every Day    Substances: Nicotine   Substance and Sexual  "Activity    Alcohol use: No    Drug use: Never    Sexual activity: Yes     Partners: Male     Birth control/protection: None       Review of Systems   Constitutional:  Positive for fatigue (has malaise) and fever. Appetite change: has not been eating much due to fear of nausea/vomiting.  HENT:  Positive for sinus pressure. Negative for trouble swallowing.    Eyes:  Blurred vision: some at times this morning.   Respiratory:  Positive for cough and chest tightness. Negative for shortness of breath.    Cardiovascular:  Negative for chest pain. Palpitations: some when woke up this morning, resolved.  Gastrointestinal:  Negative for abdominal pain.   Genitourinary:  Negative for decreased urine volume and dysuria.   Musculoskeletal:  Negative for back pain.   Skin:  Negative for rash.   Neurological:  Negative for headache. Dizziness: some with activity this morning.      Objective   Vitals:    12/10/24 1130 12/10/24 1215 12/10/24 1236   BP: 116/60     BP Location: Left arm     Patient Position: Sitting     Cuff Size: Adult     Pulse: (!) 133  118   Temp: 100 °F (37.8 °C) (!) 102.3 °F (39.1 °C) (!) 101.2 °F (38.4 °C)   SpO2: 98%  96%   Weight: 130 kg (287 lb)     Height: 167.6 cm (66\")       Body mass index is 46.32 kg/m².    Physical Exam  Vitals and nursing note reviewed.   Constitutional:       General: She is not in acute distress.     Appearance: She is well-developed and well-groomed. She is not diaphoretic.   HENT:      Head: Normocephalic.      Right Ear: External ear normal. No decreased hearing noted. Right ear middle ear effusion: mild. Right ear erythematous TM: mild.      Left Ear: External ear normal. No decreased hearing noted. Left ear middle ear effusion: mild. Left ear erythematous TM: mild.      Nose: Nose normal.      Right Sinus: No maxillary sinus tenderness or frontal sinus tenderness.      Left Sinus: No maxillary sinus tenderness or frontal sinus tenderness.      Mouth/Throat:      Mouth: Mucous " membranes are moist.      Pharynx: Posterior oropharyngeal erythema present. No oropharyngeal exudate.   Eyes:      Conjunctiva/sclera: Conjunctivae normal.   Neck:      Vascular: No carotid bruit.   Cardiovascular:      Rate and Rhythm: Normal rate and regular rhythm. Tachycardia present.      Pulses: Normal pulses.      Heart sounds: Normal heart sounds. No murmur heard.  Pulmonary:      Effort: Pulmonary effort is normal. No respiratory distress.      Breath sounds: Normal breath sounds. No decreased breath sounds, wheezing or rales.   Abdominal:      General: Bowel sounds are normal.      Palpations: Abdomen is soft. There is no hepatomegaly or splenomegaly.      Tenderness: There is no abdominal tenderness. There is no guarding.   Musculoskeletal:      Cervical back: Normal range of motion and neck supple.      Right lower leg: No edema.      Left lower leg: No edema.   Lymphadenopathy:      Cervical: Cervical adenopathy: approx 0.5 bilateral anterior cervical lymph nodes.   Skin:     General: Skin is warm and dry.      Findings: No rash.   Neurological:      Mental Status: She is alert and oriented to person, place, and time.      Gait: Gait normal.   Psychiatric:         Mood and Affect: Mood normal.         Behavior: Behavior normal.         Thought Content: Thought content normal.         Cognition and Memory: Cognition normal.         Judgment: Judgment normal.       ECG 12 Lead    Date/Time: 12/10/2024 11:40 AM  Performed by: Matilda Hamlin APRN    Authorized by: Matilda Hamlin APRN  Comparison: compared with previous ECG from 6/16/2023  Similar to previous ECG  Comparison to previous ECG: Other than rate  Rhythm: sinus tachycardia  Rate: tachycardic  T Waves: T waves normal    Clinical impression: abnormal EKG            Lab Results   Component Value Date    WBC 7.2 08/27/2024    RBC 4.68 08/27/2024    HGB 13.3 08/27/2024    HCT 42.2 08/27/2024    MCV 90 08/27/2024    MCH 28.4 08/27/2024    MCHC 31.5  08/27/2024    RDW 12.9 08/27/2024     08/27/2024    NEUTRORELPCT 60 08/27/2024    LYMPHORELPCT 25 08/27/2024    MONORELPCT 8 08/27/2024    EOSRELPCT 6 08/27/2024    BASORELPCT 1 08/27/2024    NEUTROABS 4.3 08/27/2024    LYMPHSABS 1.8 08/27/2024    MONOSABS 0.6 08/27/2024    EOSABS 0.4 08/27/2024    BASOSABS 0.1 08/27/2024     Lab Results   Component Value Date    GLUCOSE 82 08/27/2024    BUN 11 08/27/2024    CREATININE 0.73 08/27/2024    BCR 15 08/27/2024    K 4.4 08/27/2024    CO2 21 08/27/2024    CALCIUM 9.3 08/27/2024    PROTENTOTREF 6.6 08/27/2024    ALBUMIN 4.0 08/27/2024    LABIL2 1.6 04/12/2024    AST 15 08/27/2024    ALT 11 08/27/2024      Lab Results   Component Value Date    CHLPL 171 10/18/2023    TRIG 168 (H) 10/18/2023    HDL 39 (L) 10/18/2023    VLDL 29 10/18/2023     (H) 10/18/2023     Lab Results   Component Value Date    TSH 2.000 08/27/2024     Lab Results   Component Value Date    HGBA1C 5.2 05/11/2022     Lab Results   Component Value Date    COLORU Yellow 09/13/2022    CLARITYU Clear 09/13/2022    LEUKOCYTESUR Large (3+) (A) 09/13/2022    BILIRUBINUR Negative 09/13/2022          Assessment    Problem List Items Addressed This Visit       Tachycardia    Relevant Orders    ECG 12 Lead (Completed)    Chest tightness    Relevant Medications    albuterol sulfate  (90 Base) MCG/ACT inhaler    Acute cough    Current Assessment & Plan     Increase intake of clear liquids and rest.  Try plain Mucinex to thin secretions.         Relevant Orders    XR Chest PA & Lateral    POCT SARS-CoV-2 + Flu Antigen HEYDI (Completed)    CBC & Differential    Upper respiratory tract infection - Primary    Current Assessment & Plan     Get chest x-ray and labs at [facility].  Increase intake of clear liquids and rest.  Continue Tylenol or Ibuprofen as needed for fever/discomfort.  Try plain Mucinex to thin secretions.          Other Visit Diagnoses       Fever, unspecified fever cause        Relevant  Medications    acetaminophen (TYLENOL) 160 MG/5ML liquid 500 mg    Other Relevant Orders    XR Chest PA & Lateral    POCT SARS-CoV-2 + Flu Antigen HEYDI (Completed)    POC Rapid Strep A (Completed)    CBC & Differential             Return if symptoms worsen or fail to improve.  Negative for flu, COVID-19, and strep today; given Tylenol at OV due to fever and monitored HR; chest tightness and HR improved with Tylenol; pt has used Albuterol inhaler in past; will send Rx, but instructed to watch HR closely with fever as Albuterol inhaler may increase HR and to not use if HR increased; will get CBC and chest x-ray for further evaluation; pt has Apple watch at home to monitor HR; instructed to go to ER if symptoms persist or worsen, particularly if HR and fever remain elevated or symptoms worsen.       COVID-19 Precautions - Patient was compliant in wearing a mask. When I saw the patient, I used appropriate personal protective equipment (PPE) including N95 mask, gloves, and eye shield (standard procedure).  Hand hygiene was completed before and after seeing the patient.

## 2024-12-10 NOTE — PATIENT INSTRUCTIONS
Get chest x-ray and labs at [facility].  Increase intake of clear liquids and rest.  Continue Tylenol or Ibuprofen as needed for fever/discomfort.  Try plain Mucinex to thin secretions.  Follow up pending lab/test results.  Follow up if symptoms persist or worsen.

## 2024-12-10 NOTE — TELEPHONE ENCOUNTER
No labwork put in but patient did get xr chest already will get labs tmrw doesn't want to go back out [place]

## 2024-12-11 ENCOUNTER — TELEPHONE (OUTPATIENT)
Dept: FAMILY MEDICINE CLINIC | Facility: CLINIC | Age: 24
End: 2024-12-11
Payer: OTHER GOVERNMENT

## 2024-12-11 DIAGNOSIS — R11.0 NAUSEA: ICD-10-CM

## 2024-12-11 DIAGNOSIS — J06.9 UPPER RESPIRATORY TRACT INFECTION, UNSPECIFIED TYPE: Primary | ICD-10-CM

## 2024-12-11 RX ORDER — ONDANSETRON 4 MG/1
4 TABLET, FILM COATED ORAL EVERY 8 HOURS PRN
Qty: 12 TABLET | Refills: 0 | Status: SHIPPED | OUTPATIENT
Start: 2024-12-11

## 2024-12-11 RX ORDER — AZITHROMYCIN 250 MG/1
TABLET, FILM COATED ORAL
Qty: 6 TABLET | Refills: 0 | Status: SHIPPED | OUTPATIENT
Start: 2024-12-11

## 2024-12-11 NOTE — TELEPHONE ENCOUNTER
Patient resting quietly in bed.  Just returned from xray.  Pain is slightly better, not gone.  Denies further needs    Spoke with patient over the phone, patient thinks that her fever broke last night but doesn't have a thermometer. Her HR was 130 last night an 120 today but doesn't feel like her heart it beating like it was yesterday.  Patient is still coughing a lot and has nausea and dizziness. Patient states she will go to [facility] to get blood work. Faxed over order to [facility]

## 2024-12-11 NOTE — TELEPHONE ENCOUNTER
Please call to check on patient. Please see how fever and HR are running. Let her know still waiting on x-ray results. Recommend ER if still high fever and HR. Ask about CBC. Could we fax to [facility] if would be easier for her? Thanks.

## 2024-12-11 NOTE — ASSESSMENT & PLAN NOTE
Get chest x-ray and labs at [facility].  Increase intake of clear liquids and rest.  Continue Tylenol or Ibuprofen as needed for fever/discomfort.  Try plain Mucinex to thin secretions.

## 2024-12-11 NOTE — TELEPHONE ENCOUNTER
Spoke with patient over the phone; reviewed chest x-ray and CBC results; patient continues to run fever at times; heart rate will increase with fever and decrease as fever comes down with Tylenol; patient had episode of syncope during lab draw at [facility] today and was sent to the ER; patient had EKG and within normal limits; thought syncope related to not eating much due to increased nausea; will send Rx for Zofran and see if helps; will also send Rx for Z-Kirill; instructed to make sure drinking plenty of liquids; to follow-up if symptoms persist or worsen.

## 2024-12-20 ENCOUNTER — TELEPHONE (OUTPATIENT)
Dept: OBSTETRICS AND GYNECOLOGY | Facility: CLINIC | Age: 24
End: 2024-12-20
Payer: OTHER GOVERNMENT

## 2024-12-31 DIAGNOSIS — R20.0 NUMBNESS OF HAND: Primary | ICD-10-CM

## 2025-01-28 ENCOUNTER — DOCUMENTATION (OUTPATIENT)
Dept: PHYSICAL THERAPY | Facility: CLINIC | Age: 25
End: 2025-01-28
Payer: OTHER GOVERNMENT

## 2025-01-28 DIAGNOSIS — G89.29 CHRONIC LEFT-SIDED LOW BACK PAIN WITH LEFT-SIDED SCIATICA: Primary | ICD-10-CM

## 2025-01-28 DIAGNOSIS — M54.42 CHRONIC LEFT-SIDED LOW BACK PAIN WITH LEFT-SIDED SCIATICA: Primary | ICD-10-CM

## 2025-01-28 NOTE — PROGRESS NOTES
Discharge Summary  Discharge Summary from Physical Therapy Report  [address], [address], [place] [address]    Patient Information  Clair Overton  2000    Dates  PT visit: 09/18/2024 - 11/07/2024  Number of Visits: 5     Discharge Status of Patient: See Final Note dated 11/07/2024    Goals: Partially Met    Visit Diagnoses:    ICD-10-CM ICD-9-CM   1. Chronic left-sided low back pain with left-sided sciatica  M54.42 724.2    G89.29 724.3     338.29       Discharge Plan: Continue with current home exercise program as instructed    Date of Discharge 1/28/2025        Loren Curiel PT, DPT, OCS  Physical Therapist  [place] #[id number]  Electronically Signed by: Loren Curiel PT, 01/28/25, 12:37 PM EST

## 2025-02-10 NOTE — PROGRESS NOTES
Patient needs to FU in in 1 month as scheduled      Change in medications. no      Patient was advised to go to nearest ER immediately / dial 911 with any worsening of the preexisting symptoms / onset of new symptoms or distress.       Call office with any concerns.      Subjective   Clair Overton is a 21 y.o. female.     Chief Complaint   Patient presents with   • Anxiety     Follow up          History of Present Illness   Patient presents for follow up anxiety/depression: resumed Escitalopram daily and has been feeling much better; has not been as stressed; some trouble staying asleep, but has young children; some days things get to her, but able to work through better; had appointment to see psychiatry, but had to reschedule due to having COVID-19; appetite fluctuates; has had some brain fog, worse since had COVID-19 recently; has had trouble finding words at times; see PHQ-9 and HAO-7; no SI/HI.    F/U NATALIA: started Pantoprazole daily and has not seemed to help much; will get discomfort in RUQ and have tightness right after eating; needs to reschedule US abdomen; has upcoming appointment with GI.    F/U palpitations: still having palpitations 1-2 times daily; sometimes related to anxiety, other times just driving; had negative work up in 2020, told related to anxiety; no previous records received yet.    Regular menses; LMP about 2 weeks ago.    The following portions of the patient's history were reviewed and updated as appropriate: allergies, current medications, past family history, past medical history, past social history, past surgical history and problem list.      Current Outpatient Medications   Medication Sig Dispense Refill   • escitalopram (LEXAPRO) 10 MG tablet Take 1 tablet by mouth Daily. 30 tablet 2   • pantoprazole (Protonix) 20 MG EC tablet Take 1 tablet by mouth Daily. 30 tablet 1     No current facility-administered medications for this visit.       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   • Shoulder joint pain 8/5/2013       Past Surgical History:   Procedure Laterality  Date   • NO PAST SURGERIES         Family History   Problem Relation Age of Onset   • Breast cancer Mother 45   • Arthritis Father    • Diabetes Maternal Grandmother    • Hypertension Maternal Grandmother    • Thyroid disease Maternal Grandmother    • Migraines Maternal Grandmother    • Heart failure Maternal Grandfather    • Diabetes Maternal Grandfather    • Osteoarthritis Maternal Grandfather    • Asthma Maternal Grandfather    • Hyperlipidemia Maternal Grandfather    • Hypertension Maternal Grandfather    • Colon cancer Paternal Grandfather        Social History     Socioeconomic History   • Marital status:    Tobacco Use   • Smoking status: Never Smoker   • Smokeless tobacco: Never Used   Vaping Use   • Vaping Use: Every day   • Substances: Nicotine   Substance and Sexual Activity   • Alcohol use: No   • Drug use: Never   • Sexual activity: Not Currently       Review of Systems   Constitutional: Positive for fatigue. Negative for chills, fever, unexpected weight gain and unexpected weight loss. Appetite change: some decrease in appetite due to discomfort in RUQ after eating.   HENT: Negative for ear pain and sore throat.    Eyes: Negative for blurred vision.   Respiratory: Negative for cough, chest tightness and shortness of breath.    Cardiovascular: Negative for chest pain and leg swelling.   Gastrointestinal: Positive for indigestion. Negative for abdominal pain (see HPI), constipation and diarrhea. Acid reflux: some at times with certain foods, avoiding sodas has helped.   Endocrine: Polydipsia: some.   Genitourinary: Negative for dysuria and frequency.   Musculoskeletal: Negative for arthralgias. Back pain: has chronic discomfort in back, pain will radiate down left leg; history of back fracture in 2015; no bladder or bowel dysfunction.   Skin: Negative for rash.   Neurological: Negative for dizziness. Light-headedness: some at times with position changes; had vertigo during recent pregnancy and  "still lingering some. Headache: some at times.   Hematological: Does not bruise/bleed easily.       Objective   Vitals:    06/13/22 1311   BP: 102/68   BP Location: Left arm   Patient Position: Sitting   Cuff Size: Adult   Pulse: 92   Temp: 98 °F (36.7 °C)   SpO2: 97%   Weight: 110 kg (242 lb 3.2 oz)   Height: 167.6 cm (66\")     Body mass index is 39.09 kg/m².    Physical Exam  Vitals and nursing note reviewed.   Constitutional:       General: She is not in acute distress.     Appearance: She is well-developed and well-groomed. She is not diaphoretic.   HENT:      Head: Normocephalic.      Right Ear: External ear normal. Right ear middle ear effusion: mild.      Left Ear: External ear normal. A middle ear effusion is present. Left ear erythematous TM: mild.      Nose: Nose normal.      Comments: Recently finished antibiotic for ear infection     Mouth/Throat:      Mouth: Mucous membranes are moist.      Pharynx: Posterior oropharyngeal erythema present.   Eyes:      Conjunctiva/sclera: Conjunctivae normal.   Neck:      Vascular: No carotid bruit.   Cardiovascular:      Rate and Rhythm: Normal rate and regular rhythm.      Pulses: Normal pulses.      Heart sounds: Normal heart sounds. No murmur heard.  Pulmonary:      Effort: Pulmonary effort is normal. No respiratory distress.      Breath sounds: Normal breath sounds.   Abdominal:      General: Bowel sounds are normal.      Palpations: Abdomen is soft. There is no hepatomegaly or splenomegaly.      Tenderness: There is abdominal tenderness (mild) in the epigastric area. There is no guarding.   Musculoskeletal:      Cervical back: Normal range of motion and neck supple.      Right lower leg: No edema.      Left lower leg: No edema.   Lymphadenopathy:      Cervical: No cervical adenopathy.   Skin:     General: Skin is warm and dry.      Findings: No rash.   Neurological:      Mental Status: She is alert and oriented to person, place, and time.      Gait: Gait is " intact.   Psychiatric:         Mood and Affect: Mood normal.         Behavior: Behavior normal.         Thought Content: Thought content normal.         Cognition and Memory: Cognition normal.         Judgment: Judgment normal.         ECG 12 Lead    Date/Time: 6/13/2022 1:49 PM  Performed by: Matilda Hamlin APRN  Authorized by: Matilda Hamlin APRN   Comparison: not compared with previous ECG   Previous ECG: no previous ECG available  Rhythm: sinus rhythm  Rate: normal  T Waves: T waves normal    Clinical impression: normal ECG            Lab Results   Component Value Date    WBC 7.1 05/11/2022    RBC 4.99 05/11/2022    HGB 13.2 05/11/2022    HCT 42.0 05/11/2022    MCV 84 05/11/2022    MCH 26.5 (L) 05/11/2022    MCHC 31.4 (L) 05/11/2022    RDW 13.1 05/11/2022     05/11/2022    NEUTRORELPCT 63 05/11/2022    LYMPHORELPCT 28 05/11/2022    MONORELPCT 6 05/11/2022    EOSRELPCT 2 05/11/2022    BASORELPCT 1 05/11/2022    NEUTROABS 4.4 05/11/2022    LYMPHSABS 2.0 05/11/2022    MONOSABS 0.4 05/11/2022    EOSABS 0.2 05/11/2022    BASOSABS 0.1 05/11/2022     Lab Results   Component Value Date    GLUCOSE 84 05/11/2022    BUN 15 05/11/2022    CREATININE 0.73 05/11/2022    BCR 21 05/11/2022    K 4.9 05/11/2022    CO2 23 05/11/2022    CALCIUM 9.7 05/11/2022    PROTENTOTREF 6.9 05/11/2022    ALBUMIN 4.3 05/11/2022    LABIL2 1.7 05/11/2022    AST 12 05/11/2022    ALT 13 05/11/2022      Lab Results   Component Value Date    CHLPL 171 05/11/2022    TRIG 90 05/11/2022    HDL 43 05/11/2022    VLDL 17 05/11/2022     (H) 05/11/2022     Lab Results   Component Value Date    TSH 1.010 05/11/2022     Lab Results   Component Value Date    HGBA1C 5.2 05/11/2022     Lab Results   Component Value Date    COLORU Dark Yellow 05/11/2022    CLARITYU Clear 05/11/2022    LEUKOCYTESUR Negative 05/11/2022    BILIRUBINUR Negative 05/11/2022          Assessment    Problem List Items Addressed This Visit     Anxiety - Primary    Current  Assessment & Plan     Continue Escitalopram daily.  Call  regarding psychiatry referral.  Continue to work on healthy diet and exercise.           Relevant Medications    escitalopram (LEXAPRO) 10 MG tablet    Severe episode of recurrent major depressive disorder, without psychotic features (HCC)    Current Assessment & Plan     Patient's depression is recurrent and is moderate without psychosis. Their depression is currently active and the condition is improving with treatment. This will be reassessed in 3 months. F/U as described:patient will continue current medication therapy and patient referred to Mental Health Specialist.  Continue Escitalopram daily.           Relevant Medications    escitalopram (LEXAPRO) 10 MG tablet    Palpitations    Relevant Orders    ECG 12 Lead    Ambulatory Referral to Cardiology    Gastroesophageal reflux disease    Current Assessment & Plan     Not much improvement with Pantoprazole.  Reschedule abdominal US.  Follow up as scheduled with GI.           Indigestion    Current Assessment & Plan     Reschedule Abdominal US.  Follow up as scheduled with GI.                 Return in about 3 months (around 9/13/2022) for Recheck.or sooner if symptoms persist or worsen.  Patient has continued to have palpitations despite better control of anxiety; will refer to cardiology for further evaluation.       COVID-19 Precautions - Patient was compliant in wearing a mask. When I saw the patient, I used appropriate personal protective equipment (PPE) including mask, gloves, and eye shield (standard procedure).  Hand hygiene was completed before and after seeing the patient.

## 2025-03-17 ENCOUNTER — OFFICE VISIT (OUTPATIENT)
Dept: FAMILY MEDICINE CLINIC | Facility: CLINIC | Age: 25
End: 2025-03-17
Payer: OTHER GOVERNMENT

## 2025-03-17 VITALS
BODY MASS INDEX: 45.8 KG/M2 | HEART RATE: 105 BPM | WEIGHT: 285 LBS | TEMPERATURE: 97.1 F | HEIGHT: 66 IN | DIASTOLIC BLOOD PRESSURE: 72 MMHG | OXYGEN SATURATION: 99 % | SYSTOLIC BLOOD PRESSURE: 118 MMHG

## 2025-03-17 DIAGNOSIS — M25.512 LEFT SHOULDER PAIN, UNSPECIFIED CHRONICITY: Primary | ICD-10-CM

## 2025-03-17 DIAGNOSIS — R00.2 PALPITATIONS: ICD-10-CM

## 2025-03-17 DIAGNOSIS — F41.9 ANXIETY: ICD-10-CM

## 2025-03-17 DIAGNOSIS — F33.1 MODERATE EPISODE OF RECURRENT MAJOR DEPRESSIVE DISORDER: ICD-10-CM

## 2025-03-17 DIAGNOSIS — L30.4 INTERTRIGO: ICD-10-CM

## 2025-03-17 PROCEDURE — 99214 OFFICE O/P EST MOD 30 MIN: CPT | Performed by: NURSE PRACTITIONER

## 2025-03-17 RX ORDER — MELOXICAM 7.5 MG/1
7.5 TABLET ORAL DAILY PRN
Qty: 30 TABLET | Refills: 1 | Status: SHIPPED | OUTPATIENT
Start: 2025-03-17

## 2025-03-17 RX ORDER — NYSTATIN 100000 U/G
1 OINTMENT TOPICAL 2 TIMES DAILY
Qty: 15 G | Refills: 0 | Status: SHIPPED | OUTPATIENT
Start: 2025-03-17

## 2025-03-17 NOTE — PROGRESS NOTES
"Subjective   Clair Overton is a 24 y.o. female.     Chief Complaint   Patient presents with    Shoulder Pain     She has been having pain for about 6 months now. She said it is always a three but when she moves or uses her arm it goes to a 10.       History of Present Illness   Patient presents with c/o left shoulder pain; symptoms started about 6 months ago; symptoms worse when pt moves arm; rates pain as \"10\" on scale of 1-10 when moves arm; has decreased ROM of shoulder due to discomfort; has had trouble with right shoulder in past so has tried some of those home exercises as well as strengthening with weights and has not helped; has tried Tylenol or Ibuprofen; has tried ice and massage gun, but did not help much; no known injury or fall; symptoms started out feeling sore on occasion and noted after laying on shoulder wrong, but now more consistent and more sharp pain; occasional neck pain along trapezius muscle as well as along collar bone; has weakness in left arm.    F/U palpitations: takes Atenolol daily; had not been taking Atenolol consistently and noted symptoms, but resolved with resuming Atenolol consistently; no recent follow up cardio.    F/U anxiety/depression: takes Escitalopram daily and works well; occasional trouble falling asleep and staying asleep, but overall better; no SI/HI.    F/U intertrigo: tried Nystatin powder and would just clump up; then tried Desonide, but did not seem to help much.      The following portions of the patient's history were reviewed and updated as appropriate: allergies, current medications, past family history, past medical history, past social history, past surgical history and problem list.    Current Outpatient Medications on File Prior to Visit   Medication Sig    atenolol (TENORMIN) 25 MG tablet TAKE 1 TABLET BY MOUTH EVERY NIGHT    cetirizine (zyrTEC) 10 MG tablet Take 1 tablet by mouth Daily.    escitalopram (Lexapro) 20 MG tablet Take 1 tablet by mouth " Daily.    hydrOXYzine (ATARAX) 25 MG tablet TAKE 1 TABLET BY MOUTH AT NIGHT AS NEEDED FOR ITCHING OR SLEEP    [DISCONTINUED] albuterol sulfate  (90 Base) MCG/ACT inhaler Inhale 2 puffs Every 6 (Six) Hours As Needed for Shortness of Air.    [DISCONTINUED] azithromycin (Zithromax Z-Kirill) 250 MG tablet Take 2 tablets the first day, then 1 tablet daily for 4 days.    [DISCONTINUED] desonide (DesOwen) 0.05 % cream Apply 1 Application topically to the appropriate area as directed 2 (Two) Times a Day As Needed for Irritation.    [DISCONTINUED] ondansetron (Zofran) 4 MG tablet Take 1 tablet by mouth Every 8 (Eight) Hours As Needed for Nausea or Vomiting.    SUMAtriptan (Imitrex) 50 MG tablet Take one tablet at onset of headache. May repeat dose one time in 2 hours if headache not relieved. (Patient not taking: Reported on 3/17/2025)    [DISCONTINUED] escitalopram (LEXAPRO) 10 MG tablet TAKE 1 TABLET BY MOUTH DAILY (Patient taking differently: Take 2 tablets by mouth Daily.)     Current Facility-Administered Medications on File Prior to Visit   Medication    [DISCONTINUED] acetaminophen (TYLENOL) 160 MG/5ML liquid 500 mg        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    Intertrigo     using nystatin    Shoulder joint pain 08/05/2013       Past Surgical History:   Procedure Laterality Date    CHOLECYSTECTOMY      CHOLECYSTECTOMY WITH INTRAOPERATIVE CHOLANGIOGRAM N/A 01/27/2023    Procedure: LAPAROSCOPIC CHOLECYSTECTOMY WITH INTRAOPERATIVE CHOLANGIOGRAM, POSSIBLE OPEN;  Surgeon: Mary Blanton MD;  Location: [facility] OR;  Service: General;  Laterality: N/A;    ENDOSCOPY N/A 09/21/2022    Procedure:  ESOPHAGOGASTRODUODENOSCOPY WITH COLD BIOPSIES AND WITH BALLOON DILATATION 18MM-20MM;  Surgeon: Luis A Galindo MD;  [place] [facility] [place];  Service: Gastroenterology;  Laterality: N/A;  S. RING, ESOPHAGITIS       Family History   Problem Relation Age of Onset    Breast cancer Mother 47    Cancer Mother         Breast cancer    Ulcerative colitis Father     Arthritis Father     Irritable bowel syndrome Maternal Grandmother     Diabetes Maternal Grandmother     Hypertension Maternal Grandmother     Thyroid disease Maternal Grandmother     Migraines Maternal Grandmother     Diverticulosis Maternal Grandmother     Liver disease Maternal Grandmother     Heart failure Maternal Grandfather     Diabetes Maternal Grandfather     Osteoarthritis Maternal Grandfather     Asthma Maternal Grandfather     Hyperlipidemia Maternal Grandfather     Hypertension Maternal Grandfather     Colon cancer Paternal Grandfather     Colon polyps Neg Hx     Crohn's disease Neg Hx     Malig Hyperthermia Neg Hx        Social History     Socioeconomic History    Marital status:    Tobacco Use    Smoking status: Never    Smokeless tobacco: Never   Vaping Use    Vaping status: Every Day    Substances: Nicotine   Substance and Sexual Activity    Alcohol use: No    Drug use: Never    Sexual activity: Yes     Partners: Male     Birth control/protection: None       Review of Systems   Constitutional:  Negative for appetite change, chills, fever, unexpected weight gain and unexpected weight loss. Fatigue: has improved with increased exercise.  Respiratory:  Negative for cough, chest tightness and shortness of breath.    Cardiovascular:  Negative for chest pain, palpitations (some at times, see HPI) and leg swelling.   Gastrointestinal:  Negative for abdominal pain, constipation, diarrhea, GERD and indigestion.   Genitourinary:  Negative for dysuria and frequency.   Neurological:  Negative for dizziness and numbness. Light-headedness: some  "with changing positions too fast.      Objective   Vitals:    03/17/25 0806   BP: 118/72   BP Location: Left arm   Patient Position: Sitting   Cuff Size: Large Adult   Pulse: 105   Temp: 97.1 °F (36.2 °C)   TempSrc: Temporal   SpO2: 99%   Weight: 129 kg (285 lb)   Height: 167.6 cm (65.98\")     Body mass index is 46.02 kg/m².    Physical Exam  Vitals and nursing note reviewed.   Constitutional:       General: She is not in acute distress.     Appearance: She is well-developed and well-groomed. She is not diaphoretic.   HENT:      Head: Normocephalic.      Right Ear: External ear normal.      Left Ear: External ear normal.   Eyes:      Conjunctiva/sclera: Conjunctivae normal.   Neck:      Vascular: No carotid bruit.   Cardiovascular:      Rate and Rhythm: Normal rate and regular rhythm.      Pulses: Normal pulses.      Heart sounds: Normal heart sounds. No murmur heard.  Pulmonary:      Effort: Pulmonary effort is normal. No respiratory distress.      Breath sounds: Normal breath sounds.   Abdominal:      General: Bowel sounds are normal.      Palpations: Abdomen is soft. There is no hepatomegaly or splenomegaly.      Tenderness: There is no abdominal tenderness. There is no guarding.   Musculoskeletal:      Right shoulder: Normal pulse.      Left shoulder: Bony tenderness: with palpation of anterior shoulder. Decreased range of motion (with abduction past 90 degrees). Normal pulse.      Cervical back: Normal range of motion and neck supple.      Right lower leg: No edema.      Left lower leg: No edema.      Comments: Positive empty can, negative drop arm   Skin:     General: Skin is warm and dry.   Neurological:      Mental Status: She is alert and oriented to person, place, and time.      Gait: Gait normal.   Psychiatric:         Mood and Affect: Mood normal.         Behavior: Behavior normal.         Thought Content: Thought content normal.         Cognition and Memory: Cognition normal.         Judgment: Judgment " normal.         Lab Results   Component Value Date    WBC 7.2 08/27/2024    RBC 4.68 08/27/2024    HGB 13.3 08/27/2024    HCT 42.2 08/27/2024    MCV 90 08/27/2024    MCH 28.4 08/27/2024    MCHC 31.5 08/27/2024    RDW 12.9 08/27/2024     08/27/2024    NEUTRORELPCT 60 08/27/2024    LYMPHORELPCT 25 08/27/2024    MONORELPCT 8 08/27/2024    EOSRELPCT 6 08/27/2024    BASORELPCT 1 08/27/2024    NEUTROABS 5.8 12/11/2024    LYMPHSABS 1.8 08/27/2024    MONOSABS 0.6 08/27/2024    EOSABS 0 12/11/2024    BASOSABS 0 12/11/2024     Lab Results   Component Value Date    GLUCOSE 82 08/27/2024    BUN 11 08/27/2024    CREATININE 0.73 08/27/2024    BCR 15 08/27/2024    K 4.4 08/27/2024    CO2 21 08/27/2024    CALCIUM 9.3 08/27/2024    ALBUMIN 4.0 08/27/2024    AST 15 08/27/2024    ALT 11 08/27/2024      Lab Results   Component Value Date    CHLPL 171 10/18/2023    TRIG 168 (H) 10/18/2023    HDL 39 (L) 10/18/2023    VLDL 29 10/18/2023     (H) 10/18/2023     Lab Results   Component Value Date    TSH 2.000 08/27/2024     Lab Results   Component Value Date    HGBA1C 5.2 05/11/2022     Lab Results   Component Value Date    COLORU Yellow 09/13/2022    CLARITYU Clear 09/13/2022    LEUKOCYTESUR Large (3+) (A) 09/13/2022    BILIRUBINUR Negative 09/13/2022          Assessment    Problem List Items Addressed This Visit       Anxiety    Current Assessment & Plan   Stable.  Continue Escitalopram daily.         Moderate episode of recurrent major depressive disorder    Current Assessment & Plan   Patient's depression is a recurrent episode that is moderate without psychosis. Depression is active and stable.    Plan:   Continue current medication therapy   Continue Escitalopram daily.  Followup at the next regular appointment.          Palpitations    Current Assessment & Plan   Stable.  Continue Atenolol daily.          Relevant Orders    Comprehensive Metabolic Panel    Intertrigo    Current Assessment & Plan   Try Nystatin ointment  and see if helps.         Relevant Medications    nystatin (MYCOSTATIN) 154218 UNIT/GM ointment    Left shoulder pain - Primary    Current Assessment & Plan   Continue increased intake of clear liquids and rest.  Try ice/heat to left neck and shoulder, whichever feels better.  Try Meloxicam instead of Ibuprofen.  No other anti-inflammatory medications while taking Meloxicam (no Aleve, Ibuprofen, Naproxen, etc.).  Take Meloxicam with food.          Relevant Medications    meloxicam (Mobic) 7.5 MG tablet    Other Relevant Orders    Ambulatory Referral to Orthopedic Surgery         Return in about 1 month (around 4/17/2025) for Annual physical, Recheck.or sooner if symptoms persist or worsen.  Will try Meloxicam for shoulder pain; discussed possible SE with Meloxicam.

## 2025-03-17 NOTE — PATIENT INSTRUCTIONS
Continue increased intake of clear liquids and rest.  Try ice/heat to left neck and shoulder, whichever feels better.  Try Meloxicam instead of Ibuprofen.  No other anti-inflammatory medications while taking Meloxicam (no Aleve, Ibuprofen, Naproxen, etc.).  Take Meloxicam with food.   Follow up pending lab results.  Follow up in 1 month for physical with fasting labs, or sooner if symptoms persist or worsen.

## 2025-03-18 LAB
ALBUMIN SERPL-MCNC: 4.1 G/DL (ref 3.5–5.2)
ALBUMIN/GLOB SERPL: 1.4 G/DL
ALP SERPL-CCNC: 87 U/L (ref 39–117)
ALT SERPL-CCNC: 24 U/L (ref 1–33)
AST SERPL-CCNC: 22 U/L (ref 1–32)
BILIRUB SERPL-MCNC: 0.9 MG/DL (ref 0–1.2)
BUN SERPL-MCNC: 11 MG/DL (ref 6–20)
BUN/CREAT SERPL: 12.1 (ref 7–25)
CALCIUM SERPL-MCNC: 9.3 MG/DL (ref 8.6–10.5)
CHLORIDE SERPL-SCNC: 102 MMOL/L (ref 98–107)
CO2 SERPL-SCNC: 24.8 MMOL/L (ref 22–29)
CREAT SERPL-MCNC: 0.91 MG/DL (ref 0.57–1)
EGFRCR SERPLBLD CKD-EPI 2021: 89.4 ML/MIN/1.73
GLOBULIN SER CALC-MCNC: 2.9 GM/DL
GLUCOSE SERPL-MCNC: 82 MG/DL (ref 65–99)
POTASSIUM SERPL-SCNC: 4.5 MMOL/L (ref 3.5–5.2)
PROT SERPL-MCNC: 7 G/DL (ref 6–8.5)
SODIUM SERPL-SCNC: 139 MMOL/L (ref 136–145)

## 2025-03-18 NOTE — ASSESSMENT & PLAN NOTE
Continue increased intake of clear liquids and rest.  Try ice/heat to left neck and shoulder, whichever feels better.  Try Meloxicam instead of Ibuprofen.  No other anti-inflammatory medications while taking Meloxicam (no Aleve, Ibuprofen, Naproxen, etc.).  Take Meloxicam with food.

## 2025-03-19 ENCOUNTER — OFFICE VISIT (OUTPATIENT)
Dept: OBSTETRICS AND GYNECOLOGY | Facility: CLINIC | Age: 25
End: 2025-03-19
Payer: OTHER GOVERNMENT

## 2025-03-19 VITALS
BODY MASS INDEX: 47.82 KG/M2 | WEIGHT: 287 LBS | DIASTOLIC BLOOD PRESSURE: 84 MMHG | HEIGHT: 65 IN | SYSTOLIC BLOOD PRESSURE: 122 MMHG | HEART RATE: 93 BPM

## 2025-03-19 DIAGNOSIS — Z01.419 ENCOUNTER FOR GYNECOLOGICAL EXAMINATION WITHOUT ABNORMAL FINDING: Primary | ICD-10-CM

## 2025-03-19 DIAGNOSIS — Z80.3 FAMILY HISTORY OF BREAST CANCER IN MOTHER: ICD-10-CM

## 2025-03-19 NOTE — PROGRESS NOTES
GYN Annual Exam     CC- Here for annual exam.     Clair Overton is a 24 y.o. female who presents for annual well woman exam. Periods are regular every 28-30 days, lasting 4 days. Dysmenorrhea: mild to severe depending on the month. Cyclic symptoms include none. No intermenstrual bleeding, spotting, or discharge.    OB History    No obstetric history on file.         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    Intertrigo     using nystatin    Shoulder joint pain 08/05/2013       Past Surgical History:   Procedure Laterality Date    CHOLECYSTECTOMY      CHOLECYSTECTOMY WITH INTRAOPERATIVE CHOLANGIOGRAM N/A 01/27/2023    Procedure: LAPAROSCOPIC CHOLECYSTECTOMY WITH INTRAOPERATIVE CHOLANGIOGRAM, POSSIBLE OPEN;  Surgeon: Mary Blanton MD;  Location: [facility] MAIN OR;  Service: General;  Laterality: N/A;    ENDOSCOPY N/A 09/21/2022    Procedure: ESOPHAGOGASTRODUODENOSCOPY WITH COLD BIOPSIES AND WITH BALLOON DILATATION 18MM-20MM;  Surgeon: Luis A Galindo MD;  Location: [facility] MAIN OR;  Service: Gastroenterology;  Laterality: N/A;  S. RING, ESOPHAGITIS         Current Outpatient Medications:     atenolol (TENORMIN) 25 MG tablet, TAKE 1 TABLET BY MOUTH EVERY NIGHT, Disp: 90 tablet, Rfl: 0    cetirizine (zyrTEC) 10 MG tablet, Take 1 tablet by mouth Daily., Disp: , Rfl:     escitalopram (Lexapro) 20 MG tablet, Take 1 tablet by mouth Daily., Disp: 90 tablet,  Rfl: 1    hydrOXYzine (ATARAX) 25 MG tablet, TAKE 1 TABLET BY MOUTH AT NIGHT AS NEEDED FOR ITCHING OR SLEEP, Disp: 96.42 tablet, Rfl: 0    meloxicam (Mobic) 7.5 MG tablet, Take 1 tablet by mouth Daily As Needed for Mild Pain or Moderate Pain. Take with food., Disp: 30 tablet, Rfl: 1    nystatin (MYCOSTATIN) 749834 UNIT/GM ointment, Apply 1 Application topically to the appropriate area as directed 2 (Two) Times a Day., Disp: 15 g, Rfl: 0    SUMAtriptan (Imitrex) 50 MG tablet, Take one tablet at onset of headache. May repeat dose one time in 2 hours if headache not relieved. (Patient not taking: Reported on 3/17/2025), Disp: 12 tablet, Rfl: 1    No Known Allergies    Social History     Tobacco Use    Smoking status: Never    Smokeless tobacco: Never   Vaping Use    Vaping status: Every Day    Substances: Nicotine   Substance Use Topics    Alcohol use: No    Drug use: Never       Family History   Problem Relation Age of Onset    Ulcerative colitis Father     Arthritis Father     Breast cancer Mother 47    Cancer Mother         Breast cancer    Colon cancer Paternal Grandfather     Irritable bowel syndrome Maternal Grandmother     Diabetes Maternal Grandmother     Hypertension Maternal Grandmother     Thyroid disease Maternal Grandmother     Migraines Maternal Grandmother     Diverticulosis Maternal Grandmother     Liver disease Maternal Grandmother     Heart failure Maternal Grandfather     Diabetes Maternal Grandfather     Osteoarthritis Maternal Grandfather     Asthma Maternal Grandfather     Hyperlipidemia Maternal Grandfather     Hypertension Maternal Grandfather     Colon polyps Neg Hx     Crohn's disease Neg Hx     Malig Hyperthermia Neg Hx     Ovarian cancer Neg Hx     Uterine cancer Neg Hx        Review of Systems   Constitutional:  Negative for chills and fever.   Gastrointestinal:  Negative for abdominal pain, constipation and diarrhea.   Genitourinary:  Negative for menstrual problem, pelvic pain, vaginal  "bleeding and vaginal discharge.   All other systems reviewed and are negative.      /84   Pulse 93   Ht 165.1 cm (65\")   Wt 130 kg (287 lb)   LMP  (Within Weeks)   BMI 47.76 kg/m²     Physical Exam  Constitutional:       General: She is not in acute distress.     Appearance: She is well-developed. She is obese.   Genitourinary:      Vulva normal.      Right Labia: No lesions or Bartholin's cyst.     Left Labia: No lesions or Bartholin's cyst.     No inguinal adenopathy present in the right or left side.     No vaginal discharge or bleeding.        Right Adnexa: not tender, not full and no mass present.     Left Adnexa: not tender, not full and no mass present.     No cervical motion tenderness or friability.      Uterus is not enlarged or tender.      No uterine mass detected.     Uterus is anteverted.   Breasts:     Right: No inverted nipple, mass or nipple discharge.      Left: No inverted nipple, mass or nipple discharge.   HENT:      Head: Normocephalic and atraumatic.      Nose: Nose normal.   Eyes:      Conjunctiva/sclera: Conjunctivae normal.      Pupils: Pupils are equal, round, and reactive to light.   Neck:      Thyroid: No thyromegaly.   Cardiovascular:      Rate and Rhythm: Normal rate and regular rhythm.      Heart sounds: Normal heart sounds. No murmur heard.  Pulmonary:      Effort: Pulmonary effort is normal. No respiratory distress.      Breath sounds: Normal breath sounds.   Abdominal:      General: Abdomen is flat. There is no distension.      Palpations: Abdomen is soft.      Tenderness: There is no abdominal tenderness.   Musculoskeletal:         General: No deformity. Normal range of motion.      Cervical back: Normal range of motion and neck supple.      Right lower leg: No edema.      Left lower leg: No edema.   Lymphadenopathy:      Lower Body: No right inguinal adenopathy. No left inguinal adenopathy.   Neurological:      Mental Status: She is alert and oriented to person, place, " and time.   Skin:     General: Skin is warm and dry.      Findings: No erythema.   Psychiatric:         Behavior: Behavior normal.         Thought Content: Thought content normal.         Judgment: Judgment normal.   Vitals reviewed. Exam conducted with a chaperone present.               Assessment     Diagnoses and all orders for this visit:    1. Encounter for gynecological examination without abnormal finding (Primary)  -     IGP, Rfx Aptima HPV ASCU    2. Family history of breast cancer in mother    GYN exam   Updated and good  BP good   Declined contraception     Family hx of breast cancer in mother.   47 years of age, Suggest checking on BRCA status (she is unsure)   For now would not consider more than routine screening      Plan     1) Breast Health - Clinical breast exam yearly, Discussed American cancer society recommendations for breast cancer screening, and Self breast awareness monthly  CBE normal updated   2) Pap - updated today, expectations reviewed.   3) Smoking status- Non-smoker   4) Eat a balanced diet including regular green vegetables.   5) Follow up prn and one year.       Ankit Diaz MD   3/19/2025  10:47 EDT

## 2025-03-24 LAB
CONV .: NORMAL
CYTOLOGIST CVX/VAG CYTO: NORMAL
CYTOLOGY CVX/VAG DOC CYTO: NORMAL
CYTOLOGY CVX/VAG DOC THIN PREP: NORMAL
DX ICD CODE: NORMAL
OTHER STN SPEC: NORMAL
SERVICE CMNT-IMP: NORMAL
STAT OF ADQ CVX/VAG CYTO-IMP: NORMAL

## 2025-03-25 ENCOUNTER — PATIENT ROUNDING (BHMG ONLY) (OUTPATIENT)
Dept: OBSTETRICS AND GYNECOLOGY | Facility: CLINIC | Age: 25
End: 2025-03-25
Payer: OTHER GOVERNMENT

## 2025-03-25 NOTE — PROGRESS NOTES
My chart message has been sent to the patient for PATIENT ROUNDING with [facility].

## 2025-03-27 ENCOUNTER — OFFICE VISIT (OUTPATIENT)
Dept: ORTHOPEDIC SURGERY | Facility: CLINIC | Age: 25
End: 2025-03-27
Payer: OTHER GOVERNMENT

## 2025-03-27 VITALS — BODY MASS INDEX: 46 KG/M2 | HEIGHT: 66 IN | WEIGHT: 286.2 LBS | TEMPERATURE: 98.3 F

## 2025-03-27 DIAGNOSIS — M25.512 LEFT SHOULDER PAIN, UNSPECIFIED CHRONICITY: ICD-10-CM

## 2025-03-27 DIAGNOSIS — M75.42 ROTATOR CUFF IMPINGEMENT SYNDROME OF LEFT SHOULDER: Primary | ICD-10-CM

## 2025-03-27 NOTE — PROGRESS NOTES
[facility]              New Problem      Patient Name: Clair Overton  : 2000  Primary Care Physician: Matilda Hamlin APRN        Chief Complaint:  Left shoulder pain    HPI:   Clair Overton is a 24 y.o. year old who presents today for evaluation.  History of Present Illness  The patient presents for evaluation of shoulder pain.    She reports experiencing sharp, shooting pains in her shoulder, particularly when lying down or moving it back. The pain, which has been present for approximately 6 months, radiates up to her neck. She is right-handed and has not experienced any specific injury to the area. Over time, the pain has progressively worsened. She also reports difficulty in reaching behind her back, necessitating assistance from her  to remove her bra. Additionally, she experiences a popping sensation in her shoulder, although without associated pain. She has not taken any medication for the pain but was prescribed meloxicam by her primary care physician, which she has not yet obtained. She has previously used a posture corrector to maintain her shoulders back but found it too painful to continue its use. 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 She has a history of shoulder issues dating back to , at which time she was informed that her shoulder socket was excessively large.    Supplemental Information  She has no known issues with blood pressure. She takes atenolol for palpitations.    MEDICATIONS  Current: Atenolol.  Past: Meloxicam (prescribed but not yet obtained).    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 MAIN OR;  Service: General;  Laterality: N/A;    ENDOSCOPY N/A 09/21/2022    Procedure: ESOPHAGOGASTRODUODENOSCOPY WITH COLD BIOPSIES AND WITH BALLOON DILATATION 18MM-20MM;  Surgeon: Luis A Galindo MD;  [place] [facility] [place];  Service: Gastroenterology;  Laterality: N/A;  S. RING, ESOPHAGITIS     Social History     Occupational History    Not on file   Tobacco Use    Smoking status: Never    Smokeless tobacco: Never   Vaping Use    Vaping status: Every Day    Substances: Nicotine   Substance and Sexual Activity    Alcohol use: No    Drug use: Never    Sexual activity: Yes     Partners: Male     Birth control/protection: None          Allergies: No Known Allergies    Medications:   Home Medications:  Current Outpatient Medications on File Prior to Visit   Medication Sig    atenolol (TENORMIN) 25 MG tablet TAKE 1 TABLET BY MOUTH EVERY NIGHT    cetirizine (zyrTEC) 10 MG tablet Take 1 tablet by mouth Daily.    escitalopram (Lexapro) 20 MG tablet Take 1 tablet by mouth Daily.    hydrOXYzine (ATARAX) 25 MG tablet TAKE 1 TABLET BY MOUTH AT NIGHT AS NEEDED FOR ITCHING OR SLEEP    meloxicam (Mobic) 7.5 MG tablet Take 1 tablet by mouth Daily As Needed for Mild Pain or Moderate Pain. Take with food.    nystatin (MYCOSTATIN) 127124 UNIT/GM ointment Apply 1 Application topically to the appropriate area as directed 2 (Two) Times a Day.    SUMAtriptan (Imitrex) 50 MG tablet Take one tablet at onset of headache. May repeat dose one time in 2 hours if headache not relieved.     [DISCONTINUED] escitalopram (LEXAPRO) 10 MG tablet TAKE 1 TABLET BY MOUTH DAILY (Patient taking differently: Take 2 tablets by mouth Daily.)     No current facility-administered medications on file prior to visit.         ROS:  ROS negative except as listed in the HPI.    Physical Exam:   24 y.o. female  Body mass index is 46.19 kg/m²., 130 kg (286 lb 3.2 oz)  Vitals:    03/27/25 0934   Temp: 98.3 °F (36.8 °C)     General: Alert, cooperative, appears well and in no observable distress. Appears stated age and BMI as listed above.  HEENT: Normocephalic, atraumatic on external visual inspection.  CV: No significant peripheral edema.  Respiratory: Normal respiratory effort.  Skin: Warm & well perfused; appropriate skin turgor.  Psych: Appropriate mood & affect.  Neuro: Gross sensation and motor intact in affected extremity/extremities.  Vascular: Peripheral pulses palpable in affected extremity/extremities.   Physical Exam  Shoulder examination was performed.    MSK Exam:    Left Shoulder  No obvious deformities or wounds.   No redness or significant swelling.  +Definitive painful arc.  +Impingement signs  +Neer Test  +Hawkin's Sign  Flexion 180  ER 60  IR lumbar spine  Strength is 4+/5 strength with isometric testing of the rotator cuff and painful    Right Shoulder  No deformity or wounds.  No redness or swelling.  No tenderness to palpation.  Full, painless AROM.  Cuff Strength 4+ to 5/5 with isometric testing of the rotator cuff.  Negative provocative testing.    Brief examination of the cervical spine did not reproduce any specific radicular pattern or symptoms.   Strength is reasonable and symmetric in the upper extremities.       Radiology:    Results  Imaging  X-rays of the shoulder show no abnormalities.    The following X-rays were ordered/reviewed today to evaluate the patient's symptoms: Shoulder: AP, Scapular Y and Axillary Lateral of left shoulder(s) show no obvious bony pathology. Humeral head is well  seated in the glenoid, reasonable SA and GH intervals. No prior films available for comparison.    Procedure:   N/A      Misc. Data/Labs: N/A    Assessment & Plan:      ICD-10-CM ICD-9-CM   1. Rotator cuff impingement syndrome of left shoulder  M75.42 726.10   2. Left shoulder pain, unspecified chronicity  M25.512 719.41     No orders of the defined types were placed in this encounter.    Orders Placed This Encounter   Procedures    XR Shoulder 2+ View Left    Ambulatory Referral to Physical Therapy for Evaluation & Treatment       Assessment & Plan  1. Shoulder pain.  The patient's x-ray results are within normal limits. The popping sensation she experiences could be attributed to inflammation or the movement of the scapula across the ribs. It is also possible that the popping originates from the shoulder itself. Her hypermobility may predispose her to overuse injuries. The pain could potentially be a result of poor posture, leading to strain on the upper back and increased stress on the shoulder. She has been advised to take meloxicam 7.5 mg once daily with food, and if necessary, increase the dosage to 15 mg. She has been cautioned against concurrent use of Aleve or ibuprofen while on meloxicam. A prescription for meloxicam 15 mg will be provided if she is unable to obtain the 7.5 mg dosage. She has been recommended to apply Voltaren gel topically and use ice for inflammation. A referral for physical therapy at [facility] has been made to focus on strengthening the shoulder. If there is no improvement with these interventions, an MRI may be considered. Consider additional work up of C-spine but currently suspect this is more shoulder/posture and mechanics.    Follow-up  The patient will follow up in 6 weeks.    Continue with activity modifications as needed/discussed.  Continue ICE and/or HEAT PRN.  Recommend to continue activity as tolerated, focus on stretching and strengthening of the joint.    Focus on  posture and body mechanics to improve/prevent symptoms.   Patient encouraged to call with questions or concerns prior to follow up.  Will discuss with attending as needed.  Consider additional referrals, work up and/or advanced imaging as indicated or if patient fails to respond to conservative care.    Return in about 6 weeks (around 5/8/2025) for Recheck. If symptoms change call for sooner appt..    Patient or patient representative verbalized consent for the use of Ambient Listening during the visit with  MARY Oconnor for chart documentation. 3/28/2025  10:37 EDT     MARY Ceja    Dictation software was used to complete a portion or all of this note.

## 2025-04-07 ENCOUNTER — OFFICE VISIT (OUTPATIENT)
Dept: FAMILY MEDICINE CLINIC | Facility: CLINIC | Age: 25
End: 2025-04-07
Payer: OTHER GOVERNMENT

## 2025-04-07 VITALS
TEMPERATURE: 97.3 F | DIASTOLIC BLOOD PRESSURE: 72 MMHG | SYSTOLIC BLOOD PRESSURE: 110 MMHG | WEIGHT: 285.2 LBS | HEIGHT: 66 IN | BODY MASS INDEX: 45.83 KG/M2 | OXYGEN SATURATION: 98 % | HEART RATE: 93 BPM

## 2025-04-07 DIAGNOSIS — R00.2 PALPITATIONS: ICD-10-CM

## 2025-04-07 DIAGNOSIS — F41.9 ANXIETY: ICD-10-CM

## 2025-04-07 DIAGNOSIS — G43.909 MIGRAINE WITHOUT STATUS MIGRAINOSUS, NOT INTRACTABLE, UNSPECIFIED MIGRAINE TYPE: ICD-10-CM

## 2025-04-07 DIAGNOSIS — Z13.220 ENCOUNTER FOR LIPID SCREENING FOR CARDIOVASCULAR DISEASE: ICD-10-CM

## 2025-04-07 DIAGNOSIS — G47.00 INSOMNIA, UNSPECIFIED TYPE: ICD-10-CM

## 2025-04-07 DIAGNOSIS — R41.840 ATTENTION DEFICIT: ICD-10-CM

## 2025-04-07 DIAGNOSIS — M25.50 ARTHRALGIA OF MULTIPLE JOINTS: ICD-10-CM

## 2025-04-07 DIAGNOSIS — Z13.6 ENCOUNTER FOR LIPID SCREENING FOR CARDIOVASCULAR DISEASE: ICD-10-CM

## 2025-04-07 DIAGNOSIS — L29.9 ITCHING: ICD-10-CM

## 2025-04-07 DIAGNOSIS — F33.1 MODERATE EPISODE OF RECURRENT MAJOR DEPRESSIVE DISORDER: ICD-10-CM

## 2025-04-07 DIAGNOSIS — J30.9 ALLERGIC RHINITIS, UNSPECIFIED SEASONALITY, UNSPECIFIED TRIGGER: ICD-10-CM

## 2025-04-07 DIAGNOSIS — L30.4 INTERTRIGO: ICD-10-CM

## 2025-04-07 DIAGNOSIS — R53.83 FATIGUE, UNSPECIFIED TYPE: ICD-10-CM

## 2025-04-07 DIAGNOSIS — M25.512 LEFT SHOULDER PAIN, UNSPECIFIED CHRONICITY: ICD-10-CM

## 2025-04-07 DIAGNOSIS — Z00.00 ENCOUNTER FOR ANNUAL PHYSICAL EXAM: Primary | ICD-10-CM

## 2025-04-07 PROBLEM — J06.9 UPPER RESPIRATORY TRACT INFECTION: Status: RESOLVED | Noted: 2024-12-10 | Resolved: 2025-04-07

## 2025-04-07 PROBLEM — R07.89 CHEST TIGHTNESS: Status: RESOLVED | Noted: 2024-12-10 | Resolved: 2025-04-07

## 2025-04-07 PROBLEM — R05.1 ACUTE COUGH: Status: RESOLVED | Noted: 2024-12-10 | Resolved: 2025-04-07

## 2025-04-07 PROBLEM — N92.6 MISSED MENSES: Status: RESOLVED | Noted: 2024-08-27 | Resolved: 2025-04-07

## 2025-04-07 NOTE — PROGRESS NOTES
Subjective   Clair Overton is a 24 y.o. female.     Chief Complaint   Patient presents with    Annual Exam       History of Present Illness   Patient presents for CPE with fasting labs.  Nutrition:  somewhat healthy diet, has been trying to eat less fast food.  Exercise:  some exercise, recently bought home gym and has been doing strength training and cardio for about 1 hour about 3-4 times per week   Dental:  no recent dental visits, plans to schedule  Vision:  no recent eye exam, no vision correction, no problems with vision  Hearing:  some trouble hearing, particularly if background noise  Immunizations:  UTD, declines COVID-19 vaccine  PAP:  sees Dr. Diaz for female care; last PAP 3/2025  Mammo:  mammo never performed; mother with family history of breast cancer, diagnosed at age 47 years  Colonoscopy/Cologuard:  never performed; paternal GF with family history of colon cancer diagnosed in early 70s    F/U left shoulder pain: saw ortho and referred to PT; has upcoming appointment with PT at [facility] on 4/21/25; no more back pain since finished PT at [facility] on [facility]; has some weakness in left arm; has not started Meloxicam due to trouble getting medication from pharmacy.    F/U palpitations: takes Atenolol daily and works well; no recent follow up with cardiology.     F/U anxiety/depression: takes Escitalopram daily and works well; also takes Hydroxyzine as needed for increased anxiety or trouble sleeping and helps; does not need to take very often; no problems with sleep; no SI/HI.     F/U intertrigo: tried Nystatin ointment and helps some; had previously tried powder and would just clump up; then tried Desonide, but did not seem to help much.    F/U migraines: has not needed to use Sumatriptan recently; occasional headaches; will try Tylenol and caffeine at times; will take Sumatriptan if Tylenol or caffeine do not help.    F/U itching: no problems as long as takes daily Zyrtec.    Would like  referral to psychiatry for screening for ADHD; has trouble focusing and has memory concerns and seems to be getting worse over time; also has hard time staying on task; will know needs to get things done, but will not be able to get things done.      The following portions of the patient's history were reviewed and updated as appropriate: allergies, current medications, past family history, past medical history, past social history, past surgical history and problem list.    Current Outpatient Medications on File Prior to Visit   Medication Sig    atenolol (TENORMIN) 25 MG tablet TAKE 1 TABLET BY MOUTH EVERY NIGHT    cetirizine (zyrTEC) 10 MG tablet Take 1 tablet by mouth Daily.    escitalopram (Lexapro) 20 MG tablet Take 1 tablet by mouth Daily.    hydrOXYzine (ATARAX) 25 MG tablet TAKE 1 TABLET BY MOUTH AT NIGHT AS NEEDED FOR ITCHING OR SLEEP    nystatin (MYCOSTATIN) 528843 UNIT/GM ointment Apply 1 Application topically to the appropriate area as directed 2 (Two) Times a Day.    SUMAtriptan (Imitrex) 50 MG tablet Take one tablet at onset of headache. May repeat dose one time in 2 hours if headache not relieved.    meloxicam (Mobic) 7.5 MG tablet Take 1 tablet by mouth Daily As Needed for Mild Pain or Moderate Pain. Take with food. (Patient not taking: Reported on 4/7/2025)    [DISCONTINUED] escitalopram (LEXAPRO) 10 MG tablet TAKE 1 TABLET BY MOUTH DAILY (Patient taking differently: Take 2 tablets by mouth Daily.)     No current facility-administered medications on file prior to visit.        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    Intertrigo     using nystatin    Shoulder joint pain 08/05/2013       Past  Surgical History:   Procedure Laterality Date    CHOLECYSTECTOMY      CHOLECYSTECTOMY WITH INTRAOPERATIVE CHOLANGIOGRAM N/A 01/27/2023    Procedure: LAPAROSCOPIC CHOLECYSTECTOMY WITH INTRAOPERATIVE CHOLANGIOGRAM, POSSIBLE OPEN;  Surgeon: Mary Blanton MD;  Location: [facility] MAIN OR;  Service: General;  Laterality: N/A;    ENDOSCOPY N/A 09/21/2022    Procedure: ESOPHAGOGASTRODUODENOSCOPY WITH COLD BIOPSIES AND WITH BALLOON DILATATION 18MM-20MM;  Surgeon: Luis A Galindo MD;  [place] [facility] [place];  Service: Gastroenterology;  Laterality: N/A;  S. RING, ESOPHAGITIS       Family History   Problem Relation Age of Onset    Ulcerative colitis Father     Arthritis Father     Breast cancer Mother 47    Cancer Mother         Breast cancer    Colon cancer Paternal Grandfather     Irritable bowel syndrome Maternal Grandmother     Diabetes Maternal Grandmother     Hypertension Maternal Grandmother     Thyroid disease Maternal Grandmother     Migraines Maternal Grandmother     Diverticulosis Maternal Grandmother     Liver disease Maternal Grandmother     Heart failure Maternal Grandfather     Diabetes Maternal Grandfather     Osteoarthritis Maternal Grandfather     Asthma Maternal Grandfather     Hyperlipidemia Maternal Grandfather     Hypertension Maternal Grandfather     Colon polyps Neg Hx     Crohn's disease Neg Hx     Malig Hyperthermia Neg Hx     Ovarian cancer Neg Hx     Uterine cancer Neg Hx        Social History     Socioeconomic History    Marital status:    Tobacco Use    Smoking status: Never    Smokeless tobacco: Never   Vaping Use    Vaping status: Every Day    Substances: Nicotine    Devices: Disposable    Passive vaping exposure: Yes   Substance and Sexual Activity    Alcohol use: No    Drug use: Never    Sexual activity: Yes     Partners: Male     Birth control/protection: None       Review of Systems   Constitutional:  Positive for fatigue. Negative for appetite change, chills, fever,  "unexpected weight gain and unexpected weight loss.   HENT:  Positive for sinus pressure (no change this time of year with change in seasion). Negative for ear pain, sore throat and trouble swallowing.    Eyes:  Negative for blurred vision and discharge.   Respiratory:  Negative for cough, chest tightness and shortness of breath. Apnea: no snoring.   Cardiovascular:  Negative for chest pain, palpitations and leg swelling.   Gastrointestinal:  Positive for constipation (some, has been trying to change diet). Negative for abdominal pain, blood in stool, diarrhea, GERD and indigestion.   Endocrine: Positive for cold intolerance. Negative for heat intolerance and polydipsia.   Genitourinary:  Negative for dysuria and frequency.   Musculoskeletal:  Negative for back pain. Arthralgias: see HPI.  Skin:  Negative for rash and skin lesions.   Neurological:  Negative for dizziness, syncope and light-headedness. Headache: see HPI.  Hematological:  Does not bruise/bleed easily.       Objective   Vitals:    04/07/25 1041   BP: 110/72   BP Location: Left arm   Patient Position: Sitting   Cuff Size: Large Adult   Pulse: 93   Temp: 97.3 °F (36.3 °C)   TempSrc: Temporal   SpO2: 98%   Weight: 129 kg (285 lb 3.2 oz)   Height: 167.6 cm (65.98\")     Body mass index is 46.05 kg/m².    Physical Exam  Vitals and nursing note reviewed.   Constitutional:       General: She is not in acute distress.     Appearance: She is well-developed and well-groomed. She is not diaphoretic.   HENT:      Head: Normocephalic and atraumatic.      Jaw: No tenderness or pain on movement.      Right Ear: Tympanic membrane and external ear normal. No decreased hearing noted.      Left Ear: Tympanic membrane and external ear normal. No decreased hearing noted.      Nose: Nose normal.      Right Sinus: No maxillary sinus tenderness or frontal sinus tenderness.      Left Sinus: No maxillary sinus tenderness or frontal sinus tenderness.      Mouth/Throat:      Mouth: " Mucous membranes are moist.      Pharynx: No oropharyngeal exudate or posterior oropharyngeal erythema.   Eyes:      Extraocular Movements: Extraocular movements intact.      Conjunctiva/sclera: Conjunctivae normal.      Pupils: Pupils are equal, round, and reactive to light.   Neck:      Thyroid: No thyromegaly.      Vascular: No carotid bruit.      Trachea: No tracheal deviation.   Cardiovascular:      Rate and Rhythm: Normal rate and regular rhythm.      Pulses: Normal pulses.      Heart sounds: Normal heart sounds. No murmur heard.  Pulmonary:      Effort: Pulmonary effort is normal. No respiratory distress.      Breath sounds: Normal breath sounds.   Abdominal:      General: Bowel sounds are normal.      Palpations: Abdomen is soft. There is no hepatomegaly or splenomegaly.      Tenderness: There is no abdominal tenderness. There is no guarding.   Musculoskeletal:         General: Normal range of motion.      Cervical back: Normal range of motion and neck supple. No bony tenderness.      Thoracic back: No bony tenderness.      Lumbar back: No bony tenderness.      Right lower leg: No edema.      Left lower leg: No edema.   Lymphadenopathy:      Cervical: No cervical adenopathy.   Skin:     General: Skin is warm and dry.   Neurological:      Mental Status: She is alert and oriented to person, place, and time.      Cranial Nerves: No cranial nerve deficit.      Motor: Motor function is intact.      Coordination: Coordination normal.      Gait: Gait normal.      Deep Tendon Reflexes: Reflexes are normal and symmetric.   Psychiatric:         Mood and Affect: Mood normal.         Behavior: Behavior normal.         Thought Content: Thought content normal.         Cognition and Memory: Cognition normal.         Judgment: Judgment normal.         Lab Results   Component Value Date    WBC 7.2 08/27/2024    RBC 4.68 08/27/2024    HGB 13.3 08/27/2024    HCT 42.2 08/27/2024    MCV 90 08/27/2024    MCH 28.4 08/27/2024     MCHC 31.5 08/27/2024    RDW 12.9 08/27/2024     08/27/2024    NEUTRORELPCT 60 08/27/2024    LYMPHORELPCT 25 08/27/2024    MONORELPCT 8 08/27/2024    EOSRELPCT 6 08/27/2024    BASORELPCT 1 08/27/2024    NEUTROABS 5.8 12/11/2024    LYMPHSABS 1.8 08/27/2024    MONOSABS 0.6 08/27/2024    EOSABS 0 12/11/2024    BASOSABS 0 12/11/2024     Lab Results   Component Value Date    GLUCOSE 82 03/17/2025    BUN 11 03/17/2025    CREATININE 0.91 03/17/2025    BCR 12.1 03/17/2025    K 4.5 03/17/2025    CO2 24.8 03/17/2025    CALCIUM 9.3 03/17/2025    ALBUMIN 4.1 03/17/2025    AST 22 03/17/2025    ALT 24 03/17/2025      Lab Results   Component Value Date    CHLPL 171 10/18/2023    TRIG 168 (H) 10/18/2023    HDL 39 (L) 10/18/2023    VLDL 29 10/18/2023     (H) 10/18/2023     Lab Results   Component Value Date    TSH 2.000 08/27/2024     Lab Results   Component Value Date    HGBA1C 5.2 05/11/2022     Lab Results   Component Value Date    COLORU Yellow 09/13/2022    CLARITYU Clear 09/13/2022    LEUKOCYTESUR Large (3+) (A) 09/13/2022    BILIRUBINUR Negative 09/13/2022          Assessment    Problem List Items Addressed This Visit       Anxiety    Current Assessment & Plan   Stable.  Continue Escitalopram daily.  Continue Hydroxyzine as needed.         Relevant Orders    Ambulatory Referral to Psychiatry    Moderate episode of recurrent major depressive disorder    Current Assessment & Plan   Patient's depression is a recurrent episode that is moderate without psychosis. Depression is active and stable.    Plan:   Continue current medication therapy   Continue Escitalopram daily.    Followup in 6 months.          Relevant Orders    Ambulatory Referral to Psychiatry    Palpitations    Current Assessment & Plan   Stable.  Continue Atenolol daily.         Allergic rhinitis    Current Assessment & Plan   Stable.  Continue Zyrtec daily.         Intertrigo    Current Assessment & Plan   Improving with treatment.  Continue Nystatin  ointment as needed.         Migraine    Current Assessment & Plan   Stable.  Continue Sumatriptan as needed.         Fatigue    Relevant Orders    CBC & Differential    Comprehensive Metabolic Panel    Ferritin    Iron    Vitamin B12 & Folate    Arthralgia of multiple joints    Relevant Orders    CBC & Differential    Comprehensive Metabolic Panel    Itching    Current Assessment & Plan   Stable.  Continue Zyrtec daily.         Insomnia    Current Assessment & Plan   Stable.  Continue Hydroxyzine nightly as needed.         Left shoulder pain    Current Assessment & Plan   Try Meloxicam when Rx received.    Follow up as scheduled for physical therapy.         Attention deficit    Relevant Orders    Ambulatory Referral to Psychiatry     Other Visit Diagnoses         Encounter for annual physical exam    -  Primary    Relevant Orders    CBC & Differential    Comprehensive Metabolic Panel    Lipid Panel With LDL / HDL Ratio    Ferritin    Iron    Vitamin B12 & Folate      Encounter for lipid screening for cardiovascular disease        Relevant Orders    Lipid Panel With LDL / HDL Ratio            Come back for fasting labs in 2-3 weeks after starting Meloxicam.  Return in about 6 months (around 10/7/2025) for Recheck.or sooner if symptoms persist or worsen.  Impression: Health maintenance visit.  Currently, eats a somewhat healthy diet and has a regular exercise routine recently.  Cervical cancer screening: UTD per GYN.  Breast cancer screening: will consider mammo around age 33 years.  Colorectal cancer screening: not indicated.  Screening lab work includes: CMP, lipid.  Immunizations: UTD, declines COVID-19 vaccine; risks and benefits of immunizations were discussed with patient.  Patient was advised to be evaluated by dentist and ophthalmology.  Advice and education were given regarding nutrition and aerobic exercise.

## 2025-04-07 NOTE — ASSESSMENT & PLAN NOTE
Patient's (Body mass index is 46.05 kg/m².) indicates that they are morbidly/severely obese (BMI > 40 or > 35 with obesity - related health condition) with health conditions that include none . Weight is improving with lifestyle modifications. BMI  is above average; BMI management plan is completed. We discussed low calorie, low carb based diet program, portion control, and increasing exercise.

## 2025-04-07 NOTE — PATIENT INSTRUCTIONS
Come back for fasting labs in 2-3 weeks after starting Meloxicam.  Continue to work on healthy diet and exercise.  Follow up pending lab results.  Follow up in 6 months, or sooner if problems or concerns.

## 2025-04-07 NOTE — ASSESSMENT & PLAN NOTE
Patient's depression is a recurrent episode that is moderate without psychosis. Depression is active and stable.    Plan:   Continue current medication therapy   Continue Escitalopram daily.    Followup in 6 months.

## 2025-04-10 DIAGNOSIS — M25.512 LEFT SHOULDER PAIN, UNSPECIFIED CHRONICITY: Primary | ICD-10-CM

## 2025-04-10 DIAGNOSIS — M75.42 ROTATOR CUFF IMPINGEMENT SYNDROME OF LEFT SHOULDER: ICD-10-CM

## 2025-04-21 ENCOUNTER — TREATMENT (OUTPATIENT)
Dept: PHYSICAL THERAPY | Facility: CLINIC | Age: 25
End: 2025-04-21
Payer: OTHER GOVERNMENT

## 2025-04-21 DIAGNOSIS — M25.512 LEFT SHOULDER PAIN, UNSPECIFIED CHRONICITY: Primary | ICD-10-CM

## 2025-04-21 DIAGNOSIS — M75.42 ROTATOR CUFF IMPINGEMENT SYNDROME OF LEFT SHOULDER: ICD-10-CM

## 2025-04-21 PROCEDURE — 97161 PT EVAL LOW COMPLEX 20 MIN: CPT | Performed by: PHYSICAL THERAPIST

## 2025-04-21 PROCEDURE — 97110 THERAPEUTIC EXERCISES: CPT | Performed by: PHYSICAL THERAPIST

## 2025-04-21 PROCEDURE — 97530 THERAPEUTIC ACTIVITIES: CPT | Performed by: PHYSICAL THERAPIST

## 2025-04-21 NOTE — PROGRESS NOTES
Physical Therapy Initial Evaluation and Plan of Care  [facility]  [address], [address], [place] [address]    Patient: Clair Overton   : 2000  Diagnosis/ICD-10 Code:  Left shoulder pain, unspecified chronicity [M25.512]  Referring practitioner: MARY Oconnor  Date: 2025    Subjective Evaluation    History of Present Illness  Mechanism of injury: Patient reports pain with (L) shoulder elevation, shrugging shoulders, and tenderness to pressure (L) clavicle. This has been going on at least 6-7 months.  She cannot recall any specific injury or provoking incident.  The pain began gradually and over time became more intense, more frequent, and aggravated by more activities. She has pain and difficulty reaching behind her and getting dressed; she requires help do don/doff her bra.  She has interrupted sleep due to pain.  Xray was unremarkable. She has tried a few strengthening and stretching exercise which do help some temporarily.  She describes popping which is unpredictable in pattern that is not typically painful.  She feels that her (L) shoulder is hanging too low and needs support. She has been prescribed meloxicam which she has not yet been able to get filled by her pharmacy.      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  Pain scale at lowest: 2-3/10.  At worst pain rating: 10  Location: (L) anterior shoulder along clavicle, (L) superior and anterior shoulder, (L) UT  Quality: sharp  Aggravating factors: lifting, movement, overhead activity, prolonged positioning, sleeping, outstretched reach and repetitive movement  Progression: worsening    Hand dominance: right    Diagnostic Tests  X-ray: normal    Patient Goals  Patient goals for therapy: decreased pain  Patient goal: pain relief, be able to use arm         Subjective Questionnaire: QuickDASH: 50%    Objective          Static Posture     Shoulders  Depressed  and rounded.    Scapulae  Left winging, right winging, left downwardly rotated and right downwardly rotated.    Tenderness     Left Shoulder   Tenderness in the biceps tendon (proximal) and bicipital groove.     Additional Tenderness Details  Mod (+) TTP diffusely throughout the (L) anterior shoulder region    Active Range of Motion   Left Shoulder   Flexion: 118 degrees with pain  Extension: 46 degrees with pain  Abduction: 72 degrees with pain  External rotation 90°: 96 degrees with pain  Internal rotation 90°: 71 degrees     Right Shoulder   Flexion: 163 degrees   Extension: 53 degrees   Abduction: 155 degrees   External rotation 90°: 97 degrees  Internal rotation 90°: 63 degrees     Strength/Myotome Testing     Left Shoulder     Planes of Motion   Flexion: 4 (pain-limited)   Extension: 4+   Abduction: 4- (pain-limited)   External rotation at 0°: 4-   Internal rotation at 0°: 4     Right Shoulder     Planes of Motion   Flexion: 5   Extension: 5   Abduction: 5   External rotation at 0°: 4+   Internal rotation at 0°: 5     Left Elbow   Flexion: 4+  Extension: 4    Right Elbow   Flexion: 5  Extension: 4+          Assessment & Plan       Assessment  Impairments: abnormal muscle firing, abnormal or restricted ROM, activity intolerance, impaired physical strength, lacks appropriate home exercise program and pain with function   Functional limitations: carrying objects, lifting, sleeping, pulling, pushing, uncomfortable because of pain, reaching behind back, reaching overhead and unable to perform repetitive tasks   Assessment details: Patient is a 24 y.o female who reports a 6-7 month history of worsening (L) shoulder pain and function. She does not recall a specific MEHRDAD. The pain is gradually becoming more intense, more frequent, and more limiting with ADLs, self-care tasks such as dressing and donning a bra (for which she now requires assistance), reaching out to the side or overhead, reaching behind her back,  lifting/carrying, and sleeping.  She reports symptoms 2-10/10.  She exhibits tenderness of (L) anterior shoulder region, decreased and painful (L) shoulder AROM, and decreased (L) UE strength.  Her QuickDASH score is 50% indicating significant perceived level of functional limitation.  She will benefit from skilled PT services to address these deficits and assist patient in painfree performance of all ADLs and reaching activities.   Prognosis: good    Goals  Plan Goals: STGs: to be met in 6 weeks  1. Patient will be independent with initial HEP  2. Patient will report improved (L) shoulder symptoms 0-4/10 for improved tolerance to ADLs and basic reaching tasks  3. Patient will report 50-75% improvement in sleep for improved restorative healing  4. Patient will demonstrate (L) shoulder AROM flexion, abduction, and IR WNL with minimal symptoms for improved ability to dress herself  5. Patient will exhibit negative TTP (L) shoulder as evidence of resolving inflammation    LTGs: to be met in 12 weeks  1. Patient will be independent with progressed HEP  2. Patient will report resolution of (L) shoulder symptoms for normal functional use of (L) UE  3. Patient will demonstrate improved (L) shoulder strength >/= 4+/5 all planes for improved ability to lift and carry her son  4. Patient will have improved QuickDASH score </= 20% for subjective evidence of functional improvement    Plan  Therapy options: will be seen for skilled therapy services  Planned modality interventions: cryotherapy, thermotherapy (hydrocollator packs) and electrical stimulation/Russian stimulation  Planned therapy interventions: ADL retraining, flexibility, functional ROM exercises, home exercise program, joint mobilization, manual therapy, neuromuscular re-education, soft tissue mobilization, strengthening, stretching and therapeutic activities  Frequency: 1x week  Duration in weeks: 12  Treatment plan discussed with: patient  Plan details: 1 x wk  due to patient request        TIMED:  Manual Therapy:         mins  38813;  Therapeutic Exercise:    28     mins  41032;     Neuromuscular [place]:        mins  45093;    Therapeutic Activity:     8     mins  91231;     Gait Training:           mins  14066;     Ultrasound:          mins  22927;    Work Conditioning             mins 29187    UNTIMED:  Electrical Stimulation:         mins  11298 ( );  Dry Needling          mins 20561    Timed Treatment:   26   mins   Total Treatment:     42   mins    PT SIGNATURE: Loren Curiel PT, DPT, OCS  Electronically signed by: Loren Curiel PT, 04/21/25, 11:11 AM EDT  [place] License #[id number]     DATE TREATMENT INITIATED: 4/21/2025    Initial Certification  Certification Period: 4/21/2025 thru 7/19/2025  I certify that the therapy services are furnished while this patient is under my care.  The services outlined above are required by this patient, and will be reviewed every 90 days.     PHYSICIAN: Juice Nielson, MARY  [phone]                                          DATE:     Please sign and return via fax to [phone]. Thank you, [facility].

## 2025-04-25 DIAGNOSIS — L29.9 ITCHING: ICD-10-CM

## 2025-04-25 DIAGNOSIS — F41.9 ANXIETY: ICD-10-CM

## 2025-04-25 DIAGNOSIS — M25.512 LEFT SHOULDER PAIN, UNSPECIFIED CHRONICITY: ICD-10-CM

## 2025-04-25 DIAGNOSIS — F33.1 MODERATE EPISODE OF RECURRENT MAJOR DEPRESSIVE DISORDER: ICD-10-CM

## 2025-04-25 DIAGNOSIS — G47.00 INSOMNIA, UNSPECIFIED TYPE: ICD-10-CM

## 2025-04-28 RX ORDER — HYDROXYZINE HYDROCHLORIDE 25 MG/1
25 TABLET, FILM COATED ORAL DAILY
Qty: 90 TABLET | Refills: 0 | Status: SHIPPED | OUTPATIENT
Start: 2025-04-28

## 2025-04-28 RX ORDER — MELOXICAM 7.5 MG/1
7.5 TABLET ORAL DAILY PRN
Qty: 30 TABLET | Refills: 5 | Status: SHIPPED | OUTPATIENT
Start: 2025-04-28

## 2025-04-28 RX ORDER — ATENOLOL 25 MG/1
25 TABLET ORAL NIGHTLY
Qty: 30 TABLET | Refills: 0 | Status: SHIPPED | OUTPATIENT
Start: 2025-04-28

## 2025-04-28 RX ORDER — ESCITALOPRAM OXALATE 20 MG/1
20 TABLET ORAL DAILY
Qty: 90 TABLET | Refills: 1 | Status: SHIPPED | OUTPATIENT
Start: 2025-04-28

## 2025-04-28 NOTE — TELEPHONE ENCOUNTER
Patient needs to be seen in office for further refills or to have primary care office take over refills.

## 2025-04-28 NOTE — TELEPHONE ENCOUNTER
LOV- 4/07/2025  NOV-n/a  LF- 4/12/2024- escitalopram          3/17/2025- meloxicam         10/18/2024- hydroxizine

## 2025-04-29 ENCOUNTER — TELEPHONE (OUTPATIENT)
Dept: PHYSICAL THERAPY | Facility: CLINIC | Age: 25
End: 2025-04-29

## 2025-04-29 NOTE — TELEPHONE ENCOUNTER
Caller: Clair Overton    Relationship:  Self    PATIENT CALLED REQUESTING TO CANCEL SAME DAY APPT.    Did the patient call AFTER the start time of their scheduled appointment?  []YES  [x]NO    Was the patient's appointment rescheduled? []YES  [x]NO    Any additional information: NOT FEELING WELL HAS MIGRAINE

## 2025-05-07 ENCOUNTER — TREATMENT (OUTPATIENT)
Dept: PHYSICAL THERAPY | Facility: CLINIC | Age: 25
End: 2025-05-07
Payer: OTHER GOVERNMENT

## 2025-05-07 DIAGNOSIS — M25.512 LEFT SHOULDER PAIN, UNSPECIFIED CHRONICITY: Primary | ICD-10-CM

## 2025-05-07 DIAGNOSIS — M75.42 ROTATOR CUFF IMPINGEMENT SYNDROME OF LEFT SHOULDER: ICD-10-CM

## 2025-05-07 NOTE — PROGRESS NOTES
Physical Therapy Daily Treatment Note               [address] [facility] [address]                                                                                                                                             [place] [address]    Patient: Clair Overton   : 2000  Referring practitioner: No ref. provider found  Date of Initial Visit: Type: THERAPY  Noted: 2025  Today's Date: 2025  Patient seen for 2 sessions       Visit Diagnoses:    ICD-10-CM ICD-9-CM   1. Left shoulder pain, unspecified chronicity  M25.512 719.41   2. Rotator cuff impingement syndrome of left shoulder  M75.42 726.10       Subjective Evaluation    History of Present Illness    Subjective comment: Pt reports that she has started taking meloxican.  It has helped decrease her pain.       Objective   See Exercise, Manual, and Modality Logs for complete treatment.   Added scapular retractions, shoulder isometrics, and table slides flexion/scaption.    Assessment & Plan       Assessment  Assessment details: Pt completed treatment with minimal c/o pain in (L) shoulder. Pt had c/o discomfort in (L) shoulder during table slides in flexion. She had no c/o with scaption table slides. Flexion table slides were not included in her HEP. She tolerated the additions of shoulder isometrics and scapular retractions. Pt's HEP was updated and given to her.  Plan to progress per POC.          Timed:         Manual Therapy:    0     mins  27221;     Therapeutic Exercise:    30     mins  27552;     Neuromuscular Carroll:    0    mins  76660;    Therapeutic Activity:     8     mins  80903;     Gait Trainin     mins  94204;     Ultrasound:     0     mins  71035;    Work Cat/Cond      0    mins   94413    Untimed:  E Stim                            0    mins   75112( [id number])    Timed Treatment:   38   mins   Total Treatment:     38   mins    Armand Parekh PTA  [place] License: [id number]

## 2025-05-08 ENCOUNTER — OFFICE VISIT (OUTPATIENT)
Dept: ORTHOPEDIC SURGERY | Facility: CLINIC | Age: 25
End: 2025-05-08
Payer: OTHER GOVERNMENT

## 2025-05-08 VITALS — HEIGHT: 66 IN | BODY MASS INDEX: 45.64 KG/M2 | TEMPERATURE: 99.8 F | WEIGHT: 284 LBS

## 2025-05-08 DIAGNOSIS — M75.42 ROTATOR CUFF IMPINGEMENT SYNDROME OF LEFT SHOULDER: Primary | ICD-10-CM

## 2025-05-08 DIAGNOSIS — M25.512 LEFT SHOULDER PAIN, UNSPECIFIED CHRONICITY: ICD-10-CM
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
## 2025-05-13 ENCOUNTER — TREATMENT (OUTPATIENT)
Dept: PHYSICAL THERAPY | Facility: CLINIC | Age: 25
End: 2025-05-13
Payer: OTHER GOVERNMENT

## 2025-05-13 DIAGNOSIS — M25.512 LEFT SHOULDER PAIN, UNSPECIFIED CHRONICITY: Primary | ICD-10-CM

## 2025-05-13 DIAGNOSIS — M75.42 ROTATOR CUFF IMPINGEMENT SYNDROME OF LEFT SHOULDER: ICD-10-CM

## 2025-05-13 PROCEDURE — 97530 THERAPEUTIC ACTIVITIES: CPT | Performed by: PHYSICAL THERAPIST

## 2025-05-13 PROCEDURE — 97110 THERAPEUTIC EXERCISES: CPT | Performed by: PHYSICAL THERAPIST

## 2025-05-13 NOTE — PROGRESS NOTES
Physical Therapy Daily Treatment Note               [address] [facility] [address]                                                                                                                                             [place] [address]    Patient: Clair Overton   : 2000  Referring practitioner: No ref. provider found  Date of Initial Visit: Type: THERAPY  Noted: 2025  Today's Date: 2025  Patient seen for 3 sessions       Visit Diagnoses:    ICD-10-CM ICD-9-CM   1. Left shoulder pain, unspecified chronicity  M25.512 719.41   2. Rotator cuff impingement syndrome of left shoulder  M75.42 726.10       Subjective Evaluation    History of Present Illness    Subjective comment: Pt reports that her (L) shoulder is a little more sore today.       Objective   See Exercise, Manual, and Modality Logs for complete treatment.   Added TB shlder ext/Row    Assessment & Plan       Assessment  Assessment details: Pt completed treatment with mild c/o pain in (L) shoulder. She had c/o pain in (L) shoulder during resisted shlder extensions/Rows. She was given vc retract her scapula when starting to pull, but had increase in pain.as the exercise progressed. Pt's HEP was updated and given to her.  Will evaluate pt's response  to the row next treatment and progress accordingly.        Timed:         Manual Therapy:    0     mins  67667;     Therapeutic Exercise:    19     mins  80443;     Neuromuscular Carroll:    0    mins  54395;    Therapeutic Activity:     8     mins  23784;     Gait Trainin     mins  43958;     Ultrasound:     0     mins  72920;    Work Cat/Cond      0    mins   29615    Untimed:  E Stim                            0    mins   76048( g0283)    Timed Treatment:   27   mins   Total Treatment:     27   mins    Armand Parekh PTA  [place] License: [id number]

## 2025-05-15 RX ORDER — ATENOLOL 25 MG/1
25 TABLET ORAL NIGHTLY
Qty: 30 TABLET | Refills: 0 | OUTPATIENT
Start: 2025-05-15

## 2025-05-20 ENCOUNTER — OFFICE VISIT (OUTPATIENT)
Dept: BEHAVIORAL HEALTH | Facility: CLINIC | Age: 25
End: 2025-05-20
Payer: OTHER GOVERNMENT

## 2025-05-20 VITALS
SYSTOLIC BLOOD PRESSURE: 106 MMHG | HEART RATE: 67 BPM | BODY MASS INDEX: 45.58 KG/M2 | HEIGHT: 66 IN | DIASTOLIC BLOOD PRESSURE: 64 MMHG | WEIGHT: 283.6 LBS

## 2025-05-20 DIAGNOSIS — Z62.810 HISTORY OF PHYSICAL AND SEXUAL ABUSE IN CHILDHOOD: ICD-10-CM

## 2025-05-20 DIAGNOSIS — Z87.898 HISTORY OF TACHYCARDIA: ICD-10-CM

## 2025-05-20 DIAGNOSIS — F33.1 MAJOR DEPRESSIVE DISORDER, RECURRENT EPISODE, MODERATE: Primary | ICD-10-CM

## 2025-05-20 DIAGNOSIS — Z86.79 HISTORY OF PALPITATIONS IN ADULTHOOD: ICD-10-CM

## 2025-05-20 DIAGNOSIS — R41.840 ATTENTION AND CONCENTRATION DEFICIT: ICD-10-CM

## 2025-05-20 DIAGNOSIS — Z86.69 HISTORY OF OBSTRUCTIVE SLEEP APNEA: ICD-10-CM

## 2025-05-20 DIAGNOSIS — Z71.89 MEDICATION CARE PLAN DISCUSSED WITH PATIENT: ICD-10-CM

## 2025-05-20 DIAGNOSIS — F41.1 GENERALIZED ANXIETY DISORDER: ICD-10-CM

## 2025-05-20 DIAGNOSIS — Z79.899 MEDICATION MANAGEMENT: ICD-10-CM

## 2025-05-20 DIAGNOSIS — F43.10 POST TRAUMATIC STRESS DISORDER (PTSD): ICD-10-CM

## 2025-05-20 NOTE — PROGRESS NOTES
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 and for health maintenance advice. Please see specific information pulled into the AVS if appropriate.   Patient to contact provider if symptoms worsen or fail to improve.        Patient screened positive for depression based on a PHQ-9 score of 19 on 5/20/2025. Follow-up recommendations include: Suicide Risk Assessment performed and Continue with medication management.       TREATMENT PLAN/GOALS: Continue supportive psychotherapy efforts and medications as indicated. Treatment and medication options discussed during today's visit. Patient acknowledged and verbally consented to continue with current treatment plan and was educated on the importance of compliance with treatment and follow-up appointments.    MEDICATION ISSUES:  ARIES reviewed as expected.  Discussed medication options and treatment plan of prescribed medication as well as the risks, benefits, and side effects including potential falls, possible impaired driving and metabolic adversities among others. Patient is agreeable to call the office with any worsening of symptoms or onset of side effects. Patient is agreeable to call 911 or go to the nearest ER should he/she begin having SI/HI.  No medication side effects or related complaints today.     MEDS ORDERED DURING VISIT:  No orders of the defined types were placed in this encounter.      Return in about 2 months (around 7/20/2025) for medication check.         I spent 59 minutes caring for Clair on this date of service. This time includes time spent by me in the following activities: preparing for the visit, reviewing tests, obtaining and/or reviewing a separately obtained history, performing a medically appropriate examination and/or evaluation, counseling and educating the patient/family/caregiver, referring and communicating with other health care professionals, documenting information in the medical record, and care coordination.      This document has been electronically signed by MARY Spicer  May 22, 2025 21:16 EDT           Part of this note may be an electronic transcription/translation of spoken language to printed text using the Dragon Dictation System.

## 2025-05-20 NOTE — PATIENT INSTRUCTIONS
"1. Should you want to get in touch with your provider, MARY Spicer, please contact [facility] Medical Assistant, [place], directly at [phone].  Recommend saving [place]'s direct number in phone as this is the PREFERRED & EASIEST way to get in contact with your provider.  Please leave a voice mail if you do not get an answer and she will return your call within 24 hrs. You will NOT be able to contact provider on Versafet, as Behavioral Health Providers are restricted. YOU MUST CALL [phone]  If you need to speak with the on call provider after hours or on weekends, please Contact the [facility] ([phone]) and staff will be able to page the provider on call directly.     2.  In the event you need to cancel an appointment, please notify the office at least 24 hrs prior:   Contact **[facility] Assistant at [facility] directly at [phone] or the [facility] ([phone])     3. MEDICATION REFILLS:  PLEASE CALL THE PHARMACY TO REQUEST ALL MEDICATION REFILLS or via Vinsula TO ENSURE YOU ARE RECEIVING YOUR MEDICATIONS IN A TIMELY MANNER. The pharmacy or Facebook lee will send this request ELECTRONICALLY to the ordering provider.   IF YOU USE AN AUTOMATED SERVICE AT THE PHARMACY FOR REFILLS AND ARE TOLD THERE ARE \"NO REFILLS REMAINING\"   PLEASE CALL THE PHARMACY & SPEAK TO A LIVE PERSON TO VERIFY IT IS THE MOST UP TO DATE PRESCRIPTION ON FILE.    All new prescriptions will have a different number, therefore, if you were given refills for a medication today or at last visit it will not have the same number as the previous prescription.     Going forward any medications for your mental health including any previously prescribed by your primary care provider will now be managed by MARY Spicer. Contact provider's MA INITIALLY when down to 5-7 days remaining to ensure new refill(s) will have this provider name on prescription for future refills.  If requested from current bottle, via " "mychart, or via pharmacy the request would be directed to that ordering provider. And to prevent confusion, inaccurate dosing, inaccurate medication refills, the management of psychotropic and/or mental health medications should only be ordered by this mental health provider.    4.  In the event you have personal crisis, contact the following crisis numbers: Suicide Prevention Hotline 1-478.715.1332 or *988, PATRICIA Helpline 2-064-450-PATRICIA; [facility] 450-668-1997; text HELLO to 764232; or 911.  If you feel like harming yourself or others, call 911 right away.  You can call the [facility] Suicide and Crisis Lifeline at  988   to speak with a counselor at the lifeline, or you can connect with one using their online chat  .    5.  Never stop an antidepressant medicine without first talking to a healthcare provider. Suddenly stopping this type of medication can cause withdrawal symptoms.    6.  Counseling and talk therapy  Counseling or therapy teaches you new coping skills and more adaptive ways of thinking about problems. These tools can help you make positive changes. The benefits of counseling often last long after treatment sessions have stopped.    7.   We would appreciate your feedback, please scan the QRS code on the back of your appointment card (or see below) and complete a brief survey.  [place] location is still not available, so please click \"[place]\" location.  Thank you     8. NO SHOW POLICY/SAME DAY CANCEL POLICY:  We understand unexpected circumstances arise; however, anytime you miss your appointment we are unable to provide you appropriate care.  In addition, each appointment missed could have been used to provide care for others.  We ask that you call at least 24 hours in advance to cancel or reschedule an appointment. We would like to take this opportunity to remind you of our policy stating patients who miss THREE or more appointments without cancelling or rescheduling 24 " hours in advance of the appointment may be subject to cancellation of any further visits with our clinic and recommendation to seek in-person services/visits. Please call [phone] to reschedule your appointment. If there are reasons that make it difficult for you to keep the appointments, please call and let us know how we can help. Please understand that medication prescribing will not continue without seeing your provider.        SPECIFIC RECOMMENDATIONS:     1.      Medications discussed at this encounter:                   -  Would recommend starting Wellbutrin  mg daily in the morning, will need to be seen by cardiology for clearance before starting due to history of heart palpitations and elevated heart rate.       2.      Psychotherapy recommendations: Advised patient to contact insurance company to determine available therapist in area and/or check Tasqe and filter results based on needs.  List of local counselors also given to patient and instructed to contact provider of choice to schedule an appointment. Advise to check listing website and/or call to determine insurance acceptance as it may have been changed since list was last revised. And to contact provider if a referral order is needed.         3.     Return to clinic: 2 months, can call for an earlier appointment, will need to be seen by cardiology before next appointment     4.  Coping mechanisms discussed with patient today as a way to gain control over feelings to prevent situation or panic attack from getting worse: grounding techniques using 5 senses (name 3 things you can see, smell, touch, etc.), slow paced breathing techniques- focus on door inhaling and exhaling at each corner, application of cold compress/ice pack/water on face or opening freezer door to allow cold air to be breathed in taking slow deep breaths, closing eyes and focus on positive thoughts.     5. Would advise to schedule an appointment with Sleep  "Medicine for management of sleep apnea due to long term effects if not treated of impaired memory, concentration.    -[employer] and How to ADHD YouTube channel- look for the pink and blue cartoon brain, she usually starts her intro as \"Hello Brains\" very helpful    -Advised patient to watch video links about \"The Wall of Awful\" the emotional barrier between you and tasks And how to get through the Wall in a healthy way.   https://[email or web address]    [email or web address]/[email or web address]-       ADHD strategies/tips to help manage symptoms, with examples, which can be helpful in day to day life.  Remember health care professionals and medications can help manage symptoms, but they can only do so much.  You're the one who can make the most difference in overcoming them, everyone is different, and some of these will hopefully work    1) Use of visual reminders (sit empty box of kleenex near items you take for the day-work bag, keys, purse)     2)  Use of lists (short list for the week or day you want to achieve), color coding, manage forgetfulness by writing things down.     3)  Use multiple methods for reminders (calendar in phone with alarms, alarm alerts, write down on calendar, sticky notes, write down key words when a forgotten item comes to mind so you don't forget later)    4)  Learn to break tasks down into smaller steps and follow a systemic approach to organization. Realize some tasks can contain multiple tasks such as:  cleaning the kitchen is not just 1 simple task, it usually includes multiple tasks (washing dishes, putting away dishes, cleaning off counters, wiping out microwave, sweeping, mopping, etc)    5)  Minimize distractions that require a higher level of concentration.      6)  Use timers for those lengthy tasks to keep on task (such as using a timer for reading for 10 minutes, or when you find yourself easily distracted by other house hold or work tasks)     7)  Establish a " "routine    8)  Deal with mail on a daily basis so it doesn't accumulate and you don't miss some important notification.    9)  Use Auto-Pay or Auto-Reminders for Bill Pay.  Go Paperless.    10)  Designate specific spaces for daily used items:  keys, purse, wallet, phone, bills     11)  Deal with it NOW.  Avoid forgetfulness, clutter, and procrastination by filing papers, cleaning up messes, returning calls or texts immediately, not some time in the future. If a task can be done in 2 minutes or less, do it on the spot, rather than putting it off for later.    12)  Give yourself more time than you need.  Plan to be early, give yourself 10 more minutes for every 30 minutes you estimate to complete a task, get somewhere.  Set reminder alarms to ensure you leave on time and make sure you have everything you need ahead of time so you're not frantically looking for your keys or phone when it's time to leave.     13)  Learn to say NO.  Impulsiveness can lead you to agree to projects, tasks, or making social engagements leaving you feeling overwhelmed, overtired, and affect the quality of your work.  Saying no to certain commitments may improve your ability to accomplish tasks, keep social dates, and live a healthier lifestyle.  Check your schedule first before agreeing.     14)  \"PIN\" Text messages to remind you to reply later, or to pay that bill reminder, etc.     15)  Physical activity is the best way to redirect your attention, when you find you are having difficulty paying attention, easily distracted TRY to do 10 minutes of physical exercise- 25 jumping jacks, running, running up and down the stairs 2-3 times.     16)  Find ways to redirect focus and attention with breathing exercises, counting, box breathing using a door or an object with 4 corners.            Advised patient to contact insurance company to determine available therapist in area and/or check [employer] and filter results based on needs.  List " of local counselors also given to patient and instructed to contact provider of choice to schedule an appointment. Advise to check listing website and/or call to determine insurance acceptance as it may have been changed since list was last revised. And to contact provider if a referral order is needed.         Counseling Services        [facility]  [email or web address] Domain [email or web address].HardMetrics  Call for consultation: [phone]  Virtual, evidence-based eating disorder treatment for all diagnoses.       Brittney Greenberg LCSW, TAAP, EMDR  [address]  [place] [address]   [phone]  Sidense website to email provider.  Specializes in trauma, all types of abuse, domestic violence    Ese Bautista M.S., L.P.A  Clinical Psychologist  [address], [place] [address] [phone]     [facility]  offers in office and telehealth options, several locations listed below.  [employer]  [place]: [phone]  [address], [place] [address]    [place]: [phone]  [address] [place] [address]    [place]: [phone]  [address] [place] [address]    [place]: [phone]  [address] [place] [address]    [place]: [phone]   [address] [place] [address]    [facility]  [address] [place] [address] [phone]    Caitlin Mccain LCSW (also a mindfulness )   [email or web address] [email or web address]/contact/    FOLUP.[employer]  Online or in person, personalized therapy    [facility]  [address]  [place] [address] [phone] or [phone]      SonoPlot   Several locations in [place], [place] and [place], [place]; offer telehealth and in person appointments  Phone: [phone]  [employer]      [facility]  [address] [place] [address]  [phone]  For appointments call, text, or email:  [email or web address]     Mary Mitchell LCS[facility] LLC  [address]   [place], [place]  [phone]     Tricia Bustamante LCSW in [place], [place] providing telehealth services currently, will be opening an office in [place].    For appointments request online:www.therapywithcoReality Mobile.Accessory Addict Society    [place] [facility]   They offer telehealth and accept most insurance plans. It's easy for the patient to schedule through their website [email or web address]  Counseling and Psychiatry in [place]: Online or In-Person  [place] largest counseling center serves [place], [place], and the entire [facility] with therapy, psychiatry, and case management.     [facility]  [address]  [place] [address] [phone]  [facility]irmaryRE[facility] biopharma     [facility]  [address] [place] [address]  [phone]  [email or web address]  Offers Children, Adolescent, & Adult therapy services.   Offers EMDR (Eye Movement Desensitization & Reprocessing) to help process traumatic experiences     [facility]   [address]   [place] [address]  [phone]  [facility]  Specializing in the treatment of anxiety, depression, addiction and trauma, we work with children, teens, and adults in individual, couple, and family therapy.  Accept most commercial and Medicaid insurances and have private pay options.   Isabel Phillip, SINDY-S: Specializes in teen therapy, crisis intervention, women's issues, LGBTQIA+.   Juan Rodriguez LCSW, [facility]: Specializes in trauma and addiction; trained and certified in EMDR     [facility]  Address: [address] [place] [address] [address], [place] [address]  Phone: [phone]  Website: wwwAB Tasty  Services  offered: Individual counseling for mood and anxiety disorders, PTSD and other trauma related issues, and substance-related disorders. Group counseling for anger management, domestic violence, parenting/parenting in recovery, and chemical dependence/substance abuse.  Insurance accepted: Chanelle Oliveros, Ese, Daniel, Elza, R, United. Accept all Medicaid, except for Aetna [facility] and [place] Medicaid.     [place] [facility]  Address: [address] [address] and [address] [place] [address], [phone]  Phone: [phone]  Website: [email or web address]  Services offered: Family, couples, and individual therapy for a wide variety of areas, such as anger management, LGBTQ support, crisis and conflict management, anxiety, depression, relationships, mood disorders, and more.  Insurance accepted: Medicaid, Medicare, and most commercial health insurances.    Mary Diehl [facility]  Address: [address] [facility] [address] [address] [address]   Phone: [phone]  Services offered: Family, couples, and individual therapy for a wide variety of areas, such as mood disorders, personality disorders, psychosis, addiction, anxiety, coping skills, grief, trauma and PTSD, transgender, self-harming, suicidal ideation, and other. Multiple types of therapy offered, including dialectical, trauma focused, and more.  Insurance accepted: Christine Oliveros, Aliya and Yoanna, Lisa, Chanelle, Medicaid, WellCare, Out of Network.    [facility]  Address: [address] [place] [address]  Phone: [phone] (11/4/21-temporary number until phone line fixed) [phone]  Services offered:  telehealth & in office visits, marriage & family therapy, art therapy, adult living skills, case management, applied behavioral analysis therapy      [facility]  Address: [address] [facility] [address]  Phone:  [phone]  Website: [email or web address] [facility] Hospit[facility].org  Services offered: Individual and couples therapy, along with veterans group therapy and equine assisted psychotherapy. Providing service for depression, trauma, anxiety, abuse, phase of life changes, and more.  Insurance accepted: Daniel, Domo [facility] and [facility], [place] EAP, [facility]  and EAP Services, Cigna,  One Source, Optum/[facility], .    Tahir Munoz, MANUEL, [facility], [place] [address]  Phone: [phone]  Services offered: Individual and Group therapy, Alcohol use, anger management, anxiety, bipolar disorder, codependency, coping skills, depression, domestic abuse/violence, drug abuse, dual diagnosis, family conflict, grief, OCD, parenting, peer relationships, self-esteem stress, substance abuse, trauma & PTSD      [facility]  Address: [address] [facility] [address], [place] [address] (office is rear entrance on [address] [facility]).  -Pat Mayers [phone]  -Megan Richards [facility]. [phone]  Website: www.theAudience  Services offered: Psychotherapy and counseling for a wide variety of areas, such as depression, OCD, PTSD, and more. Neuropsychological testing  Insurance accepted: Accepts most insurance, including Medicare and Medicaid.      [facility]  Address: [address] [place] [address]  Phone: [phone]  Website: wwwBestowed  Services offered: Individual, family, and group therapy. Also provide maternal mental health support.   Insurance accepted: Domo/Aliya [facility], Optum United, Cigna, Passport Medicaid, CareSource, Humana / [facility].    [facility]  Address: [address], [facility] [address]  Phone: [phone]  Website: wwwJOYRIDE Auto CommunityshelbyJordan Valley Semiconductors  Services offered: Counseling for anxiety, depression, trauma, guidance on mindfulness and meditation,  "and more.  Insurance accepted: Accepts most major insurance plans, health savings accounts, and private pay. Documentation can be provided for you to submit to your insurance company for possible reimbursement.    [place] (substance abuse)  [phone]  [address] , [place] [address]  [email or web address] - Sexual Trauma Recovery Services  Address: [address] [place] [address]  Other location: [facility] [address] [place] [id number]  Phone: [phone] (also 24 hour crisis line)  Website: www.MediaInterface [place]  Services offered: Short-term individual and family therapy with victims and their non-offending caregivers, and more with the goal to reduce the trauma experienced by victims of sexual abuse and sexual assault.   Insurance offered: Non-profit organization - all services are free.    [facility]  Phone: [phone] (also 24 hour crisis line)  Website: www.YuMingle.Consensus Point  Services offered: Provides a variety of services to anyone experiencing spouse/partner violence to victims and their dependent children.   Insurance offered: Non-profit organization - all services are free.             Please arrive at least 15 minutes before your scheduled appointment time to complete check in process.      IF you are scheduled for a Celladonhart VIDEO visit, YOU MUST COMPLETE THE \"E-CHECK IN\" PROCESS PRIOR TO BEGINNING THE VISIT, You may still complete the E-Check in for in office visits prior to appointment, you will receive multiple text/email reminders which will direct you further if needed.            "

## 2025-07-21 ENCOUNTER — OFFICE VISIT (OUTPATIENT)
Dept: BEHAVIORAL HEALTH | Facility: CLINIC | Age: 25
End: 2025-07-21
Payer: OTHER GOVERNMENT

## 2025-07-21 VITALS
SYSTOLIC BLOOD PRESSURE: 120 MMHG | WEIGHT: 276 LBS | BODY MASS INDEX: 44.36 KG/M2 | HEIGHT: 66 IN | DIASTOLIC BLOOD PRESSURE: 70 MMHG | HEART RATE: 83 BPM

## 2025-07-21 DIAGNOSIS — Z87.898 HISTORY OF TACHYCARDIA: ICD-10-CM

## 2025-07-21 DIAGNOSIS — Z86.79 HISTORY OF PALPITATIONS IN ADULTHOOD: ICD-10-CM

## 2025-07-21 DIAGNOSIS — Z71.89 MEDICATION CARE PLAN DISCUSSED WITH PATIENT: ICD-10-CM

## 2025-07-21 DIAGNOSIS — R41.840 ATTENTION AND CONCENTRATION DEFICIT: ICD-10-CM

## 2025-07-21 DIAGNOSIS — Z79.899 MEDICATION MANAGEMENT: ICD-10-CM

## 2025-07-21 DIAGNOSIS — F41.1 GENERALIZED ANXIETY DISORDER: Primary | ICD-10-CM

## 2025-07-21 DIAGNOSIS — F33.0 MILD EPISODE OF RECURRENT MAJOR DEPRESSIVE DISORDER: ICD-10-CM

## 2025-07-21 RX ORDER — ESCITALOPRAM OXALATE 20 MG/1
20 TABLET ORAL EVERY MORNING
Qty: 90 TABLET | Refills: 0 | Status: SHIPPED | OUTPATIENT
Start: 2025-07-21
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
## 2025-07-21 NOTE — PATIENT INSTRUCTIONS
"Should you want to get in touch with your provider, MARY Spicer, please contact MY Medical Assistant, [place], directly at [phone].  Recommend saving [place]'s direct number in phone as this is the PREFERRED & EASIEST way to get in contact with your provider.  Please leave a voice mail if you do not get an answer and she will return your call within 24 hrs. You will NOT be able to contact provider on [employer], as Behavioral Health Providers are restricted. YOU MUST CALL [phone]  If you need to speak with the on call provider after hours or on weekends, please Contact the [facility] ([phone]) and staff will be able to page the provider on call directly.     SPECIFIC RECOMMENDATIONS:     1.      Medications discussed at this encounter:                   -  no changes, once you see cardiology and if cleared will plan to start Wellbutrin XL, call provider if able to see cardiology sooner prior to next scheduled visit in 6 weeks.     2.      Psychotherapy recommendations: none     3.     Return to clinic: 6 weeks via video, after seen by cardiology, if haven't seen this appointment will need to pushed back until after.    Please arrive at least 15 minutes before your scheduled appointment time to complete check in process.      IF you are scheduled for a [employer] VIDEO visit, YOU MUST COMPLETE THE \"E-CHECK IN\" PROCESS PRIOR TO BEGINNING THE VISIT, You may still complete the E-Check in for in office visits prior to appointment, you will receive multiple text/email reminders which will direct you further if needed.            "

## 2025-08-05 ENCOUNTER — OFFICE VISIT (OUTPATIENT)
Dept: CARDIOLOGY | Age: 25
End: 2025-08-05
Payer: OTHER GOVERNMENT

## 2025-08-05 VITALS
BODY MASS INDEX: 44.36 KG/M2 | SYSTOLIC BLOOD PRESSURE: 126 MMHG | OXYGEN SATURATION: 98 % | WEIGHT: 276 LBS | HEIGHT: 66 IN | HEART RATE: 90 BPM | DIASTOLIC BLOOD PRESSURE: 64 MMHG

## 2025-08-05 DIAGNOSIS — Z91.89 AT HIGH RISK FOR ADVERSE MEDICATION EVENT: Primary | ICD-10-CM

## 2025-08-12 ENCOUNTER — TELEPHONE (OUTPATIENT)
Dept: FAMILY MEDICINE CLINIC | Facility: CLINIC | Age: 25
End: 2025-08-12
Payer: OTHER GOVERNMENT

## 2025-08-12 ENCOUNTER — TELEMEDICINE (OUTPATIENT)
Dept: BEHAVIORAL HEALTH | Facility: CLINIC | Age: 25
End: 2025-08-12
Payer: OTHER GOVERNMENT

## 2025-08-12 DIAGNOSIS — R41.840 ATTENTION AND CONCENTRATION DEFICIT: ICD-10-CM

## 2025-08-12 DIAGNOSIS — Z91.89 AT RISK FOR MEDICATION NONADHERENCE: ICD-10-CM

## 2025-08-12 DIAGNOSIS — Z71.89 MEDICATION CARE PLAN DISCUSSED WITH PATIENT: ICD-10-CM

## 2025-08-12 DIAGNOSIS — F41.1 GENERALIZED ANXIETY DISORDER: ICD-10-CM

## 2025-08-12 DIAGNOSIS — F33.0 MILD EPISODE OF RECURRENT MAJOR DEPRESSIVE DISORDER: Primary | ICD-10-CM

## 2025-08-12 DIAGNOSIS — Z79.899 MEDICATION MANAGEMENT: ICD-10-CM

## 2025-08-12 RX ORDER — BUPROPION HYDROCHLORIDE 150 MG/1
150 TABLET ORAL EVERY MORNING
Qty: 30 TABLET | Refills: 1 | Status: SHIPPED | OUTPATIENT
Start: 2025-08-13

## (undated) DEVICE — ANTIBACTERIAL UNDYED BRAIDED (POLYGLACTIN 910), SYNTHETIC ABSORBABLE SUTURE: Brand: COATED VICRYL

## (undated) DEVICE — MSK ENDO PORT O2 POM ELITE CURAPLEX A/

## (undated) DEVICE — LAPAROVUE VISIBILITY SYSTEM LAPAROSCOPIC SOLUTIONS: Brand: LAPAROVUE

## (undated) DEVICE — ENDOPATH XCEL BLADELESS TROCARS WITH STABILITY SLEEVES: Brand: ENDOPATH XCEL

## (undated) DEVICE — SOL NACL 0.9PCT 1000ML

## (undated) DEVICE — GLV SURG SENSICARE POLYISPRN W/ALOE PF LF 6.5 GRN STRL

## (undated) DEVICE — DEV INFL ALLIANCE2 SYS

## (undated) DEVICE — LOU LAP CHOLE: Brand: MEDLINE INDUSTRIES, INC.

## (undated) DEVICE — SINGLE-USE BIOPSY FORCEPS: Brand: RADIAL JAW 4

## (undated) DEVICE — STPCK 3WY D201 DISCOFIX

## (undated) DEVICE — Device

## (undated) DEVICE — GOWN,SIRUS,NON REINFRCD,LARGE,SET IN SL: Brand: MEDLINE

## (undated) DEVICE — CATH IV INSYTE AUTOGARD 14G 1 1/2IN ORNG

## (undated) DEVICE — GOWN ,SIRUS,NONREINFORCED 3XL: Brand: MEDLINE

## (undated) DEVICE — DISPOSABLE MONOPOLAR ENDOSCOPIC CORD 10 FT. (3M): Brand: KIRWAN

## (undated) DEVICE — ESOPHAGEAL BALLOON DILATATION CATHETER: Brand: CRE FIXED WIRE

## (undated) DEVICE — CONTAINER,SPECIMEN,OR STERILE,4OZ: Brand: MEDLINE

## (undated) DEVICE — DRAPE,REIN 53X77,STERILE: Brand: MEDLINE

## (undated) DEVICE — ENDOPOUCH RETRIEVER SPECIMEN RETRIEVAL BAGS: Brand: ENDOPOUCH RETRIEVER

## (undated) DEVICE — EXTENSION SET, MALE LUER LOCK ADAPTER WITH RETRACTABLE COLLAR

## (undated) DEVICE — KT ORCA ORCAPOD DISP STRL

## (undated) DEVICE — ADHS SKIN SURG TISS VISC PREMIERPRO EXOFIN HI/VISC FAST/DRY

## (undated) DEVICE — DRP C/ARM 41X74IN

## (undated) DEVICE — ENDOPATH XCEL UNIVERSAL TROCAR STABLILITY SLEEVES: Brand: ENDOPATH XCEL

## (undated) DEVICE — SUT VIC 0/0 UR6 27IN DYED J603H

## (undated) DEVICE — ENDOCUT SCISSOR TIP, DISPOSABLE: Brand: RENEW

## (undated) DEVICE — VIAL FORMLN CAP 10PCT 20ML

## (undated) DEVICE — APPL CHLORAPREP HI/LITE 26ML ORNG

## (undated) DEVICE — CATH CHOLANG 4.5F18IN BRGNDY

## (undated) DEVICE — BITEBLOCK OMNI BLOC

## (undated) DEVICE — ENDOPATH XCEL BLUNT TIP TROCARS WITH SMOOTH SLEEVES: Brand: ENDOPATH XCEL

## (undated) DEVICE — GLV SURG BIOGEL LTX PF 6